# Patient Record
Sex: MALE | Race: WHITE | Employment: FULL TIME | URBAN - METROPOLITAN AREA
[De-identification: names, ages, dates, MRNs, and addresses within clinical notes are randomized per-mention and may not be internally consistent; named-entity substitution may affect disease eponyms.]

---

## 2018-12-05 ENCOUNTER — APPOINTMENT (OUTPATIENT)
Dept: CT IMAGING | Age: 63
DRG: 552 | End: 2018-12-05
Payer: COMMERCIAL

## 2018-12-05 ENCOUNTER — HOSPITAL ENCOUNTER (INPATIENT)
Age: 63
LOS: 3 days | Discharge: HOME | DRG: 552 | End: 2018-12-08
Attending: EMERGENCY MEDICINE | Admitting: FAMILY MEDICINE
Payer: COMMERCIAL

## 2018-12-05 ENCOUNTER — APPOINTMENT (OUTPATIENT)
Dept: GENERAL RADIOLOGY | Age: 63
DRG: 552 | End: 2018-12-05
Payer: COMMERCIAL

## 2018-12-05 ENCOUNTER — APPOINTMENT (OUTPATIENT)
Dept: MRI IMAGING | Age: 63
DRG: 552 | End: 2018-12-05
Payer: COMMERCIAL

## 2018-12-05 DIAGNOSIS — S39.012A LUMBAR STRAIN, INITIAL ENCOUNTER: ICD-10-CM

## 2018-12-05 DIAGNOSIS — M51.26 LUMBAR HERNIATED DISC: ICD-10-CM

## 2018-12-05 DIAGNOSIS — M54.50 ACUTE EXACERBATION OF CHRONIC LOW BACK PAIN: ICD-10-CM

## 2018-12-05 DIAGNOSIS — M54.9 INTRACTABLE BACK PAIN: Primary | ICD-10-CM

## 2018-12-05 DIAGNOSIS — G89.29 ACUTE EXACERBATION OF CHRONIC LOW BACK PAIN: ICD-10-CM

## 2018-12-05 PROBLEM — M54.59 INTRACTABLE LOW BACK PAIN: Status: ACTIVE | Noted: 2018-12-05

## 2018-12-05 PROCEDURE — 72131 CT LUMBAR SPINE W/O DYE: CPT

## 2018-12-05 PROCEDURE — 99285 EMERGENCY DEPT VISIT HI MDM: CPT

## 2018-12-05 PROCEDURE — 96372 THER/PROPH/DIAG INJ SC/IM: CPT

## 2018-12-05 PROCEDURE — 6370000000 HC RX 637 (ALT 250 FOR IP): Performed by: NURSE PRACTITIONER

## 2018-12-05 PROCEDURE — 1200000000 HC SEMI PRIVATE

## 2018-12-05 PROCEDURE — 70030 X-RAY EYE FOR FOREIGN BODY: CPT

## 2018-12-05 PROCEDURE — 72148 MRI LUMBAR SPINE W/O DYE: CPT

## 2018-12-05 PROCEDURE — 2580000003 HC RX 258: Performed by: NURSE PRACTITIONER

## 2018-12-05 PROCEDURE — 6360000002 HC RX W HCPCS: Performed by: NURSE PRACTITIONER

## 2018-12-05 RX ORDER — MORPHINE SULFATE 4 MG/ML
4 INJECTION, SOLUTION INTRAMUSCULAR; INTRAVENOUS ONCE
Status: DISCONTINUED | OUTPATIENT
Start: 2018-12-05 | End: 2018-12-05

## 2018-12-05 RX ORDER — ACETAMINOPHEN 325 MG/1
650 TABLET ORAL EVERY 4 HOURS PRN
Status: DISCONTINUED | OUTPATIENT
Start: 2018-12-05 | End: 2018-12-08 | Stop reason: HOSPADM

## 2018-12-05 RX ORDER — SODIUM CHLORIDE 0.9 % (FLUSH) 0.9 %
10 SYRINGE (ML) INJECTION PRN
Status: DISCONTINUED | OUTPATIENT
Start: 2018-12-05 | End: 2018-12-06 | Stop reason: SDUPTHER

## 2018-12-05 RX ORDER — OXYCODONE HYDROCHLORIDE AND ACETAMINOPHEN 5; 325 MG/1; MG/1
1 TABLET ORAL ONCE
Status: COMPLETED | OUTPATIENT
Start: 2018-12-05 | End: 2018-12-05

## 2018-12-05 RX ORDER — DEXAMETHASONE SODIUM PHOSPHATE 10 MG/ML
10 INJECTION, SOLUTION INTRAMUSCULAR; INTRAVENOUS ONCE
Status: COMPLETED | OUTPATIENT
Start: 2018-12-05 | End: 2018-12-05

## 2018-12-05 RX ORDER — ONDANSETRON 4 MG/1
4 TABLET, ORALLY DISINTEGRATING ORAL ONCE
Status: COMPLETED | OUTPATIENT
Start: 2018-12-05 | End: 2018-12-05

## 2018-12-05 RX ORDER — SODIUM CHLORIDE 0.9 % (FLUSH) 0.9 %
10 SYRINGE (ML) INJECTION EVERY 12 HOURS SCHEDULED
Status: DISCONTINUED | OUTPATIENT
Start: 2018-12-05 | End: 2018-12-06 | Stop reason: SDUPTHER

## 2018-12-05 RX ORDER — ORPHENADRINE CITRATE 30 MG/ML
60 INJECTION INTRAMUSCULAR; INTRAVENOUS ONCE
Status: COMPLETED | OUTPATIENT
Start: 2018-12-05 | End: 2018-12-05

## 2018-12-05 RX ORDER — KETOROLAC TROMETHAMINE 30 MG/ML
30 INJECTION, SOLUTION INTRAMUSCULAR; INTRAVENOUS ONCE
Status: COMPLETED | OUTPATIENT
Start: 2018-12-05 | End: 2018-12-05

## 2018-12-05 RX ADMIN — ORPHENADRINE CITRATE 60 MG: 30 INJECTION INTRAMUSCULAR; INTRAVENOUS at 18:02

## 2018-12-05 RX ADMIN — DEXAMETHASONE SODIUM PHOSPHATE 10 MG: 10 INJECTION, SOLUTION INTRAMUSCULAR; INTRAVENOUS at 19:44

## 2018-12-05 RX ADMIN — OXYCODONE AND ACETAMINOPHEN 1 TABLET: 5; 325 TABLET ORAL at 19:57

## 2018-12-05 RX ADMIN — ONDANSETRON 4 MG: 4 TABLET, ORALLY DISINTEGRATING ORAL at 21:14

## 2018-12-05 RX ADMIN — HYDROMORPHONE HYDROCHLORIDE 1 MG: 1 INJECTION, SOLUTION INTRAMUSCULAR; INTRAVENOUS; SUBCUTANEOUS at 21:20

## 2018-12-05 RX ADMIN — KETOROLAC TROMETHAMINE 30 MG: 30 INJECTION, SOLUTION INTRAMUSCULAR at 18:02

## 2018-12-05 RX ADMIN — Medication 10 ML: at 23:45

## 2018-12-05 ASSESSMENT — PAIN DESCRIPTION - DESCRIPTORS
DESCRIPTORS: ACHING;DISCOMFORT;CONSTANT
DESCRIPTORS: ACHING

## 2018-12-05 ASSESSMENT — PAIN SCALES - GENERAL
PAINLEVEL_OUTOF10: 6
PAINLEVEL_OUTOF10: 10
PAINLEVEL_OUTOF10: 6
PAINLEVEL_OUTOF10: 10
PAINLEVEL_OUTOF10: 10
PAINLEVEL_OUTOF10: 5

## 2018-12-05 ASSESSMENT — PAIN DESCRIPTION - PAIN TYPE
TYPE: ACUTE PAIN

## 2018-12-05 ASSESSMENT — PAIN DESCRIPTION - ONSET: ONSET: ON-GOING

## 2018-12-05 ASSESSMENT — PAIN DESCRIPTION - ORIENTATION
ORIENTATION: LOWER
ORIENTATION: LOWER
ORIENTATION: LEFT;LOWER

## 2018-12-05 ASSESSMENT — PAIN DESCRIPTION - LOCATION
LOCATION: BACK
LOCATION: BACK;LEG
LOCATION: BACK

## 2018-12-05 ASSESSMENT — PAIN DESCRIPTION - PROGRESSION: CLINICAL_PROGRESSION: GRADUALLY IMPROVING

## 2018-12-05 ASSESSMENT — PAIN DESCRIPTION - FREQUENCY: FREQUENCY: CONTINUOUS

## 2018-12-05 NOTE — ED PROVIDER NOTES
ED Attending  CC: Alicia         Department of Emergency Medicine   ED  Provider Note  Admit Date/RoomTime: 12/5/2018  4:59 PM  ED Room: 33/33   Chief Complaint:   Back Pain (shooting pain in lower back when he bent over)    History of Present Illness   Source of history provided by:  patient. History/Exam Limitations: none. Annabella Friedman is a 66 y.o. old male who has a past medical history of:   Past Medical History:   Diagnosis Date    Arthritis     Sleep apnea     CPAP nightly    presents to the emergency department by private vehicle, for acute, sharp, stabbing shooting and throbbing central lower lumbar spine pain with radiation down left leg to the knee that started immediately at  3:45 PM prior to arrival. He will intermittently get an electrical pain. The pain was caused by bending over to  a tool and felt a \"pop\" in his back . He has a history of recurrent self limited episodes of low back pain in the past and previous spinal surgery - Dr. Muñoz Hedge years ago. Since onset the symptoms have been constant and acutely worsening and severe to profound in severity. There has been no bowel or bladder incontinence associated with the pain. There have been associated symptoms of nothing additional and denies any numbness, incontinence, dysuria, hematuria, abdominal pain, fever, hx cancer, weight loss, recent steroid use, tingling, leg weakness, new numbness, new weakness, new tingling, abdominal swelling, chest pain, pelvic pain or perianal numbness. The the pain is aggraveated by standing and any movement and relieved by nothing tried. ROS   Pertinent positives and negatives are stated within HPI, all other systems reviewed and are negative.     Past Surgical History:   Procedure Laterality Date    BACK SURGERY      lumbar laminectomy    COLONOSCOPY  12/2011    ELBOW ARTHROSCOPY      right    KNEE ARTHROSCOPY      right   [address] [address]      Dr. Jaycee Stone - cervical fusion   Social History: reports that he quit smoking about 32 years ago. He smoked 0.25 packs per day. He has never used smokeless tobacco. He reports that he drinks alcohol. He reports that he does not use drugs. Family History: family history includes Cancer in his sister; Heart Disease in his mother; High Blood Pressure in his mother. Allergies: Morphine    Physical Exam           ED Triage Vitals [12/05/18 1703]   BP Temp Temp Source Pulse Resp SpO2 Height Weight   (!) 146/78 97.5 °F (36.4 °C) Temporal 78 17 100 % 5' 10\" (1.778 m) 166 lb (75.3 kg)      Oxygen Saturation Interpretation: Normal.    Constitutional:  Alert, development consistent with age. HEENT:  NC/NT. Airway patent. Neck:  Normal ROM. Supple. Respiratory:  Clear to auscultation and breath sounds equal.  CV:  Regular rate and rhythm, normal heart sounds, without pathological murmurs, ectopy, gallops, or rubs. GI:  Abdomen Soft, nontender, good bowel sounds. No firm or pulsatile mass. Back: lower lumbar spine left sided and central.             Tenderness: Moderate to central lower lumbar spine and left SI joint . Swelling: mild. Range of Motion: diminished range with pain. CVA Tenderness: No.            Straight leg raising: unable to lie down due to excruciating pain            Skin:  no erythema, rash or swelling noted. Distal Function:              Motor deficit: none. Sensory deficit: none; full sensation intact to light touch. Pulse deficit: none. Calf Tenderness:  No Bilateral.               Edema:  none Both lower extremity(s). Reflexes: Bilateral knee,ankle,biceps normal.  Gait:  limp. Integument:  Normal turgor. Warm, dry, without visible rash. Lymphatics: No lymphangitis or adenopathy noted. Neurological:  Oriented. Motor functions intact.     Lab / Imaging Results   (All laboratory and radiology results have been personally reviewed by myself)  Labs:  No

## 2018-12-06 ENCOUNTER — APPOINTMENT (OUTPATIENT)
Dept: GENERAL RADIOLOGY | Age: 63
DRG: 552 | End: 2018-12-06
Payer: COMMERCIAL

## 2018-12-06 PROBLEM — M51.369 DDD (DEGENERATIVE DISC DISEASE), LUMBAR: Status: ACTIVE | Noted: 2018-12-06

## 2018-12-06 PROBLEM — M51.36 DDD (DEGENERATIVE DISC DISEASE), LUMBAR: Status: ACTIVE | Noted: 2018-12-06

## 2018-12-06 PROBLEM — G47.30 SLEEP APNEA: Status: ACTIVE | Noted: 2018-12-06

## 2018-12-06 LAB
ANION GAP SERPL CALCULATED.3IONS-SCNC: 12 MMOL/L (ref 7–16)
BASOPHILS ABSOLUTE: 0.01 E9/L (ref 0–0.2)
BASOPHILS RELATIVE PERCENT: 0.2 % (ref 0–2)
BUN BLDV-MCNC: 14 MG/DL (ref 8–23)
CALCIUM SERPL-MCNC: 9.6 MG/DL (ref 8.6–10.2)
CHLORIDE BLD-SCNC: 104 MMOL/L (ref 98–107)
CO2: 25 MMOL/L (ref 22–29)
CREAT SERPL-MCNC: 0.7 MG/DL (ref 0.7–1.2)
EOSINOPHILS ABSOLUTE: 0 E9/L (ref 0.05–0.5)
EOSINOPHILS RELATIVE PERCENT: 0 % (ref 0–6)
GFR AFRICAN AMERICAN: >60
GFR NON-AFRICAN AMERICAN: >60 ML/MIN/1.73
GLUCOSE BLD-MCNC: 131 MG/DL (ref 74–99)
HCT VFR BLD CALC: 43.3 % (ref 37–54)
HEMOGLOBIN: 14.5 G/DL (ref 12.5–16.5)
IMMATURE GRANULOCYTES #: 0.04 E9/L
IMMATURE GRANULOCYTES %: 0.7 % (ref 0–5)
LYMPHOCYTES ABSOLUTE: 0.55 E9/L (ref 1.5–4)
LYMPHOCYTES RELATIVE PERCENT: 9.4 % (ref 20–42)
MCH RBC QN AUTO: 30.7 PG (ref 26–35)
MCHC RBC AUTO-ENTMCNC: 33.5 % (ref 32–34.5)
MCV RBC AUTO: 91.5 FL (ref 80–99.9)
MONOCYTES ABSOLUTE: 0.12 E9/L (ref 0.1–0.95)
MONOCYTES RELATIVE PERCENT: 2.1 % (ref 2–12)
NEUTROPHILS ABSOLUTE: 5.11 E9/L (ref 1.8–7.3)
NEUTROPHILS RELATIVE PERCENT: 87.6 % (ref 43–80)
PDW BLD-RTO: 12.5 FL (ref 11.5–15)
PLATELET # BLD: 289 E9/L (ref 130–450)
PMV BLD AUTO: 10.3 FL (ref 7–12)
POTASSIUM REFLEX MAGNESIUM: 4.2 MMOL/L (ref 3.5–5)
RBC # BLD: 4.73 E12/L (ref 3.8–5.8)
SODIUM BLD-SCNC: 141 MMOL/L (ref 132–146)
WBC # BLD: 5.8 E9/L (ref 4.5–11.5)

## 2018-12-06 PROCEDURE — 1200000000 HC SEMI PRIVATE

## 2018-12-06 PROCEDURE — 6370000000 HC RX 637 (ALT 250 FOR IP): Performed by: FAMILY MEDICINE

## 2018-12-06 PROCEDURE — 77002 NEEDLE LOCALIZATION BY XRAY: CPT

## 2018-12-06 PROCEDURE — 85025 COMPLETE CBC W/AUTO DIFF WBC: CPT

## 2018-12-06 PROCEDURE — 36415 COLL VENOUS BLD VENIPUNCTURE: CPT

## 2018-12-06 PROCEDURE — 2580000003 HC RX 258: Performed by: FAMILY MEDICINE

## 2018-12-06 PROCEDURE — 00HU33Z INSERTION OF INFUSION DEVICE INTO SPINAL CANAL, PERCUTANEOUS APPROACH: ICD-10-PCS | Performed by: NEUROLOGICAL SURGERY

## 2018-12-06 PROCEDURE — 3E0R3BZ INTRODUCTION OF ANESTHETIC AGENT INTO SPINAL CANAL, PERCUTANEOUS APPROACH: ICD-10-PCS | Performed by: RADIOLOGY

## 2018-12-06 PROCEDURE — 80048 BASIC METABOLIC PNL TOTAL CA: CPT

## 2018-12-06 RX ORDER — ONDANSETRON 2 MG/ML
4 INJECTION INTRAMUSCULAR; INTRAVENOUS EVERY 6 HOURS PRN
Status: DISCONTINUED | OUTPATIENT
Start: 2018-12-06 | End: 2018-12-08 | Stop reason: HOSPADM

## 2018-12-06 RX ORDER — OXYCODONE HYDROCHLORIDE AND ACETAMINOPHEN 5; 325 MG/1; MG/1
2 TABLET ORAL EVERY 4 HOURS PRN
Status: DISCONTINUED | OUTPATIENT
Start: 2018-12-06 | End: 2018-12-08 | Stop reason: HOSPADM

## 2018-12-06 RX ORDER — SODIUM CHLORIDE 0.9 % (FLUSH) 0.9 %
10 SYRINGE (ML) INJECTION EVERY 12 HOURS SCHEDULED
Status: DISCONTINUED | OUTPATIENT
Start: 2018-12-06 | End: 2018-12-08 | Stop reason: HOSPADM

## 2018-12-06 RX ORDER — OXYCODONE HYDROCHLORIDE AND ACETAMINOPHEN 5; 325 MG/1; MG/1
1 TABLET ORAL EVERY 4 HOURS PRN
Status: DISCONTINUED | OUTPATIENT
Start: 2018-12-06 | End: 2018-12-08 | Stop reason: HOSPADM

## 2018-12-06 RX ORDER — SODIUM CHLORIDE 0.9 % (FLUSH) 0.9 %
10 SYRINGE (ML) INJECTION PRN
Status: DISCONTINUED | OUTPATIENT
Start: 2018-12-06 | End: 2018-12-08 | Stop reason: HOSPADM

## 2018-12-06 RX ORDER — FENTANYL CITRATE 50 UG/ML
50 INJECTION, SOLUTION INTRAMUSCULAR; INTRAVENOUS ONCE
Status: COMPLETED | OUTPATIENT
Start: 2018-12-07 | End: 2018-12-07

## 2018-12-06 RX ADMIN — Medication 10 ML: at 20:56

## 2018-12-06 RX ADMIN — OXYCODONE AND ACETAMINOPHEN 2 TABLET: 5; 325 TABLET ORAL at 20:55

## 2018-12-06 RX ADMIN — Medication 10 ML: at 10:00

## 2018-12-06 RX ADMIN — OXYCODONE AND ACETAMINOPHEN 2 TABLET: 5; 325 TABLET ORAL at 10:00

## 2018-12-06 ASSESSMENT — PAIN DESCRIPTION - FREQUENCY
FREQUENCY: CONTINUOUS

## 2018-12-06 ASSESSMENT — PAIN DESCRIPTION - PAIN TYPE
TYPE: ACUTE PAIN

## 2018-12-06 ASSESSMENT — PAIN DESCRIPTION - DESCRIPTORS
DESCRIPTORS: ACHING;CONSTANT
DESCRIPTORS: ACHING;DISCOMFORT;CONSTANT
DESCRIPTORS: ACHING;CONSTANT;DISCOMFORT
DESCRIPTORS: ACHING;CONSTANT;DISCOMFORT

## 2018-12-06 ASSESSMENT — PAIN SCALES - GENERAL
PAINLEVEL_OUTOF10: 4
PAINLEVEL_OUTOF10: 4
PAINLEVEL_OUTOF10: 7
PAINLEVEL_OUTOF10: 6
PAINLEVEL_OUTOF10: 6
PAINLEVEL_OUTOF10: 0
PAINLEVEL_OUTOF10: 4

## 2018-12-06 ASSESSMENT — PAIN DESCRIPTION - ORIENTATION
ORIENTATION: LEFT;LOWER

## 2018-12-06 ASSESSMENT — PAIN DESCRIPTION - PROGRESSION
CLINICAL_PROGRESSION: NOT CHANGED
CLINICAL_PROGRESSION: NOT CHANGED
CLINICAL_PROGRESSION: GRADUALLY IMPROVING

## 2018-12-06 ASSESSMENT — PAIN DESCRIPTION - LOCATION
LOCATION: BACK

## 2018-12-06 ASSESSMENT — PAIN DESCRIPTION - ONSET
ONSET: ON-GOING

## 2018-12-06 NOTE — H&P
positive on the left. Strength:  Lower extremities within normal limits. AXILLAE:  No adenopathy. ABDOMEN:  Soft. No hepatosplenomegaly. No masses. ADMITTING LABORATORY DATA:  BUN 14, creatinine 0.7. White count 5800,  hemoglobin 14.5, platelet count 031X. DIAGNOSTIC STUDIES:  CT scan of the lumbar spine reveals that there is a  moderate amount of degenerative disk disease, most severe at L5-S1. There are mild _____ of central canal narrowing, but without critical  stenosis at L2-3 and L3-4. There is diffuse disk bulge and hypertrophic facet joint arthrosis, most  severe on the left at L3-4. This is associated with the stenosis of the  lateral recesses and there is also a diffuse disk bulge at L4-5. ASSESSMENT:  1.  Lumbar radiculopathy. 2.  DDD, DJD, lumbar spine. 3.  Sleep apnea. PLAN:  Neurosurgery consultation for potential nerve block and analgesic  medication.         Ladi Ventura MD    D: 12/06/2018 9:36:27       T: 12/06/2018 9:38:12     [email or web address]  Job#: [id number]     Doc#: [id number]    CC:

## 2018-12-06 NOTE — CONSULTS
[address]                  [address] [facility], [address] [place] [address]                                  CONSULTATION    PATIENT NAME: Royal Nickerson                      :        1955  MED REC NO:   [id number]                            ROOM:       5216  ACCOUNT NO:   [de-identified]                           ADMIT DATE: 2018  PROVIDER:     Odell Bishop MD    CONSULT DATE:  2018    CHIEF COMPLAINT:  Pain in the back, radiating down to the left lower  extremity. HISTORY OF PRESENT ILLNESS:  This is a 70-year-old male who is known to  me. He had undergone lumbar laminectomy, L3-4, left, six years ago for  lumbar radiculopathy. Following the surgery, he did well and had been  free of pain. Yesterday, he bent over to pick some article from the  ground and felt a pop in the back and since then has been experiencing  severe pain, radiating down from the lower back to the left leg mainly  to the knee, but sometimes down to the foot. The pain is more  pronounced in the back as compared to the left leg and some discomfort  in the right lower extremity. He denies any weakness or numbness as  such in the lower back. He underwent a CT scan of the lumbar spine,  which was consistent with the lumbar herniated disk. Subsequently, he underwent an MRI scan of the lumbar spine today and  this was also reviewed. It is reported to show degenerative disk  disease at multiple levels with a disk protrusion at the level of L4-5  on the right. PAST MEDICAL HISTORY:  Sleep apnea and arthritis. PAST SURGICAL HISTORY:  Back surgery, lumbar laminectomy L3-4, left, in  2012; colonoscopy, elbow, arthroscopic surgery, knee arthroscopic  surgery, and neck surgery. PERSONAL HISTORY:  ALLERGIES:  He is allergic to MORPHINE. SOCIAL HISTORY:  He is a former smoker, does use alcohol, but denies use  of street drugs.     PHYSICAL EXAMINATION:  GENERAL:  He is a

## 2018-12-06 NOTE — PROGRESS NOTES
Temporary Lumbar epidural catheter inserted using fluoroscopy. Pt. Tolerated well. 80mg depomedrol and 2ml 0.25 percent marcaine given.

## 2018-12-06 NOTE — ED NOTES
Lab called at this time in regards to workers comp      Brian La, [facility]  12/05/18 5130

## 2018-12-06 NOTE — PROGRESS NOTES
Subjective: The patient is awake and alert. No problems overnight. Denies chest pain, angina, and dyspnea. Denies abdominal pain. Tolerating diet. No nausea or vomiting. Comfortable when not moving,but has severe pain in low back and lateral aspect of left leg with movement  Objective:    Vitals:  /73   Pulse 83   Temp 98.1 °F (36.7 °C) (Oral)   Resp 14   Ht 5' 10\" (1.778 m)   Wt 166 lb (75.3 kg)   SpO2 99%   BMI 23.82 kg/m²     Physical Exam:  Heart:  RRR, no murmurs, gallops, or rubs. Lungs:  CTA bilaterally, no wheeze, rales or rhonchi  Abd: bowel sounds present, nontender, nondistended, no masses  Extrem:  No clubbing, cyanosis, or edema  Neuro; LE intact. Straight leg raise positive on left  Labs:  CBC:   Lab Results   Component Value Date    WBC 5.8 2018    RBC 4.73 2018    HGB 14.5 2018    HCT 43.3 2018    MCV 91.5 2018    MCH 30.7 2018    MCHC 33.5 2018    RDW 12.5 2018     2018    MPV 10.3 2018     CMP:    Lab Results   Component Value Date     2018    K 4.2 2018     2018    CO2 25 2018    BUN 14 2018    CREATININE 0.7 2018    GFRAA >60 2018    LABGLOM >60 2018    GLUCOSE 131 2018    GLUCOSE 101 2012    CALCIUM 9.6 2018        Radiology:  Ct Lumbar Spine Wo Contrast    Result Date: 2018  Patient MRN:  [id number] : 1955 Age: 66 years Gender: Male Order Date:  2018 5:15 PM EXAM: CT LUMBAR SPINE WO CONTRAST NUMBER OF IMAGES:  613 INDICATION:  Pain Pain COMPARISON: MRI lumbar spine  TECHNIQUE: Helical CT of the lumbar spine was performed from the lower thoracic spine through the upper sacrum. Sagittal reconstructions were also reviewed. Low-dose CT  acquisition technique included one of following options; 1 . Automated exposure control, 2. Adjustment of MA and or KV according to patient's size or 3.  Use of iterative predominantly inferiorly. The patient had a left laminectomy at this level seen on the previous MRI from  but the laminectomy defect is not as prominent as on the prior study due to some heterotopic bone formation. L4-5: Diffuse bulging of the disc with suggestion of a more focal small but broad-based left paracentral disc protrusion. There appears to be at least moderately severe stenosis of the left lateral recess. There is mild stenosis of the right lateral recess and mild central canal stenosis with an AP diameter of 12 mm. This is more pronounced towards the left of midline. This would be better evaluated by MRI. L5-S1: No significant disc bulge or focal disc protrusion. No central canal stenosis. There is mild stenosis of both neural foramina with mild bilateral facet joint arthrosis. CONCLUSION: 1. Multilevel degenerative disc disease, most severe at L5-S1. 2. Mild degrees of central canal narrowing but without critical stenosis at the L2-3 and L3-4 levels due to a combination of diffuse disc bulge and hypertrophic facet joint arthrosis, most severe on the left at L3-4. There is associated stenosis of the lateral recesses. At L3-4 there is moderate left neural foraminal stenosis with abutment of the exiting left L3 nerve root posterolaterally and at L2-3 there is abutment of the exiting right L2 nerve root far laterally by the bulging disc. 3. Diffuse disc bulge at L4-5 with a more focal small but broad-based left paracentral disc protrusion causing moderately severe stenosis of the left lateral recess, mild on the right, and mild central canal stenosis, more pronounced towards the left of midline. This would be better evaluated by MRI. 4. Mild stenosis of both neural foramen at L5-S1 with mild bilateral facet joint arthrosis. Please note discussion above for more complete details.     Mri Lumbar Spine Wo Contrast    Result Date: 2018  Patient MRN:  [id number] : 1955 Age: 66 years Gender: Male Order Date:  2018 9:00 PM TECHNIQUE/NUMBER OF IMAGES/COMPARISON/CLINICAL HISTORY: MRI lumbar spine 128 images T1-T2 and STIR sequences were obtained. HISTORY: Back pain intractable disc disease . Comparison study of 2012 . FINDINGS: There is no indication for bone marrow replacement process . There is a Schmorl node in the posterior-inferior corner of L1 with the some degree of edema in the STIR sequence but not diffusely throughout the subchondral region of the vertebral body . Mild broad posterior displacement seen across midline levels of L1-2, L2-3, L3-4 and L4-5 . There is no significant encroachments of the neural foramina in both sides of the midline. There is no significant degree of anatomical encroachment of the lumbar spine canal . A small right paramedian posterior disc protrusion is seen in L4-L5 level . Degenerative changes are seen in the facet joints of the lumbar spine with some hypertrophy in the levels of L3-4 mainly . There are normal appearance for the contents of the thecal sac. 1. Degenerative disc disease throughout the lumbar spine. 2. Schmorl node with some inflammatory reactive changes related with the presence of the Schmorl node in the posterior inferior corner of L1 . 3. No bone marrow edema replacement process otherwise. 4. Right paramedian posterior disc protrusion at L4-L5 with compression of the inferior aspect of the thecal sac. 5. No significant anatomical encroachment of the neural foramina bilaterally. Xr Eye Foreign Body    Result Date: 2018  Patient MRN:  [id number] : 1955 Age: 66 years Gender: Male Order Date:  2018 9:30 PM EXAM: XR EYE FOREIGN BODY NUMBER OF IMAGES:  2 INDICATION:  per protocol per protocol COMPARISON: None FINDINGS: AP and lateral views of the orbits were obtained. No radiopaque foreign bodies are identified in relationship to the orbits or elsewhere over the skull as visualized. Bilateral hearing aids are in place.

## 2018-12-07 PROCEDURE — 1200000000 HC SEMI PRIVATE

## 2018-12-07 PROCEDURE — 2580000003 HC RX 258: Performed by: FAMILY MEDICINE

## 2018-12-07 PROCEDURE — 6360000002 HC RX W HCPCS: Performed by: NEUROLOGICAL SURGERY

## 2018-12-07 RX ORDER — NALOXONE HYDROCHLORIDE 0.4 MG/ML
0.4 INJECTION, SOLUTION INTRAMUSCULAR; INTRAVENOUS; SUBCUTANEOUS PRN
Status: DISCONTINUED | OUTPATIENT
Start: 2018-12-07 | End: 2018-12-08 | Stop reason: HOSPADM

## 2018-12-07 RX ORDER — METHYLPREDNISOLONE ACETATE 80 MG/ML
80 INJECTION, SUSPENSION INTRA-ARTICULAR; INTRALESIONAL; INTRAMUSCULAR; SOFT TISSUE ONCE
Status: COMPLETED | OUTPATIENT
Start: 2018-12-08 | End: 2018-12-08

## 2018-12-07 RX ORDER — DIPHENHYDRAMINE HYDROCHLORIDE 50 MG/ML
25 INJECTION INTRAMUSCULAR; INTRAVENOUS EVERY 6 HOURS PRN
Status: DISCONTINUED | OUTPATIENT
Start: 2018-12-07 | End: 2018-12-08 | Stop reason: HOSPADM

## 2018-12-07 RX ORDER — FENTANYL CITRATE 50 UG/ML
25 INJECTION, SOLUTION INTRAMUSCULAR; INTRAVENOUS ONCE
Status: COMPLETED | OUTPATIENT
Start: 2018-12-08 | End: 2018-12-08

## 2018-12-07 RX ADMIN — FENTANYL CITRATE 50 MCG: 50 INJECTION INTRAMUSCULAR; INTRAVENOUS at 16:04

## 2018-12-07 RX ADMIN — DIPHENHYDRAMINE HYDROCHLORIDE 25 MG: 50 INJECTION, SOLUTION INTRAMUSCULAR; INTRAVENOUS at 17:07

## 2018-12-07 RX ADMIN — Medication 10 ML: at 10:02

## 2018-12-07 ASSESSMENT — PAIN SCALES - GENERAL
PAINLEVEL_OUTOF10: 0
PAINLEVEL_OUTOF10: 0

## 2018-12-07 NOTE — PROGRESS NOTES
Subjective: The patient is awake and alert. No problems overnight. Denies chest pain, angina, and dyspnea. Denies abdominal pain. Tolerating diet. No nausea or vomiting. less back and leg pain    Objective:    Vitals:  /77   Pulse 67   Temp 98.3 °F (36.8 °C) (Temporal)   Resp 16   Ht 5' 10\" (1.778 m)   Wt 166 lb (75.3 kg)   SpO2 98%   BMI 23.82 kg/m²     Physical Exam:  Heart:  RRR, no murmurs, gallops, or rubs. Lungs:  CTA bilaterally, no wheeze, rales or rhonchi  Abd: bowel sounds present, nontender, nondistended, no masses  Extrem:  No clubbing, cyanosis, or edema  Neuro; LE normal  Labs:  CBC:   Lab Results   Component Value Date    WBC 5.8 2018    RBC 4.73 2018    HGB 14.5 2018    HCT 43.3 2018    MCV 91.5 2018    MCH 30.7 2018    MCHC 33.5 2018    RDW 12.5 2018     2018    MPV 10.3 2018     BMP:    Lab Results   Component Value Date     2018    K 4.2 2018     2018    CO2 25 2018    BUN 14 2018    CREATININE 0.7 2018    CALCIUM 9.6 2018    GFRAA >60 2018    LABGLOM >60 2018    GLUCOSE 131 2018    GLUCOSE 101 2012        Radiology:  Ct Lumbar Spine Wo Contrast    Result Date: 2018  Patient MRN:  [id number] : 1955 Age: 66 years Gender: Male Order Date:  2018 5:15 PM EXAM: CT LUMBAR SPINE WO CONTRAST NUMBER OF IMAGES:  613 INDICATION:  Pain Pain COMPARISON: MRI lumbar spine  TECHNIQUE: Helical CT of the lumbar spine was performed from the lower thoracic spine through the upper sacrum. Sagittal reconstructions were also reviewed. Low-dose CT  acquisition technique included one of following options; 1 . Automated exposure control, 2. Adjustment of MA and or KV according to patient's size or 3. Use of iterative reconstruction. FINDINGS: Vertebral body height and alignment are normally maintained.  There is mild loss of disc height at the L1-2 and L3-4 levels and moderate loss of height at L5-S1, more pronounced towards the right of midline. There is slight loss of disc height at L2-3. There is mild vacuum disc phenomenon at L5-S1. There are small anterior marginal osteophytes throughout the lumbar spine, more pronounced at L1-2 anterolaterally towards the right. There is no evidence of acute fracture. No osteoblastic or osteolytic lesions are seen. L1-to: Mild diffuse bulging of the disc, more pronounced laterally/posterolaterally with mild narrowing of the lateral recesses. There is no significant central canal stenosis. There is mild narrowing of the inferior aspects of both neural foramina but there is no significant nerve root compression or displacement. L2-3: Mild diffuse bulging of the disc, asymmetrically more pronounced laterally/posterolaterally towards the right. There is mild facet joint arthrosis/hypertrophy on the left. There is mild to moderate narrowing of the lateral recesses and overall mild central canal narrowing predominantly in the transverse plane with an interfacetal diameter of 12 mm. There is mild narrowing of both neural foramina predominantly inferiorly but no significant nerve root compression or displacement is seen. There is abutment of the exiting right L2 nerve root far laterally by the bulging disc. L3-4: Mild diffuse bulging of the disc with moderate hypertrophic facet joint arthrosis on the left, mild on the right and mild ligamentum flavum hypertrophy. There is moderate stenosis of the lateral recesses and mild central canal narrowing predominantly in the transverse plane with interfacetal diameter of 11 mm. There is moderate stenosis of the left neural foramen with abutment of the exiting left L3 nerve root posterolaterally. There is mild narrowing of the right neural foramen predominantly inferiorly.  The patient had a left laminectomy at this level seen on the previous MRI from 2012 but the laminectomy defect is not as prominent as on the prior study due to some heterotopic bone formation. L4-5: Diffuse bulging of the disc with suggestion of a more focal small but broad-based left paracentral disc protrusion. There appears to be at least moderately severe stenosis of the left lateral recess. There is mild stenosis of the right lateral recess and mild central canal stenosis with an AP diameter of 12 mm. This is more pronounced towards the left of midline. This would be better evaluated by MRI. L5-S1: No significant disc bulge or focal disc protrusion. No central canal stenosis. There is mild stenosis of both neural foramina with mild bilateral facet joint arthrosis. CONCLUSION: 1. Multilevel degenerative disc disease, most severe at L5-S1. 2. Mild degrees of central canal narrowing but without critical stenosis at the L2-3 and L3-4 levels due to a combination of diffuse disc bulge and hypertrophic facet joint arthrosis, most severe on the left at L3-4. There is associated stenosis of the lateral recesses. At L3-4 there is moderate left neural foraminal stenosis with abutment of the exiting left L3 nerve root posterolaterally and at L2-3 there is abutment of the exiting right L2 nerve root far laterally by the bulging disc. 3. Diffuse disc bulge at L4-5 with a more focal small but broad-based left paracentral disc protrusion causing moderately severe stenosis of the left lateral recess, mild on the right, and mild central canal stenosis, more pronounced towards the left of midline. This would be better evaluated by MRI. 4. Mild stenosis of both neural foramen at L5-S1 with mild bilateral facet joint arthrosis. Please note discussion above for more complete details.     Mri Lumbar Spine Wo Contrast    Result Date: 2018  Patient MRN:  [id number] : 1955 Age: 66 years Gender: Male Order Date:  2018 9:00 PM TECHNIQUE/NUMBER OF IMAGES/COMPARISON/CLINICAL HISTORY: MRI lumbar spine 128 images T1-T2 and STIR sequences were obtained. HISTORY: Back pain intractable disc disease . Comparison study of 2012 . FINDINGS: There is no indication for bone marrow replacement process . There is a Schmorl node in the posterior-inferior corner of L1 with the some degree of edema in the STIR sequence but not diffusely throughout the subchondral region of the vertebral body . Mild broad posterior displacement seen across midline levels of L1-2, L2-3, L3-4 and L4-5 . There is no significant encroachments of the neural foramina in both sides of the midline. There is no significant degree of anatomical encroachment of the lumbar spine canal . A small right paramedian posterior disc protrusion is seen in L4-L5 level . Degenerative changes are seen in the facet joints of the lumbar spine with some hypertrophy in the levels of L3-4 mainly . There are normal appearance for the contents of the thecal sac. 1. Degenerative disc disease throughout the lumbar spine. 2. Schmorl node with some inflammatory reactive changes related with the presence of the Schmorl node in the posterior inferior corner of L1 . 3. No bone marrow edema replacement process otherwise. 4. Right paramedian posterior disc protrusion at L4-L5 with compression of the inferior aspect of the thecal sac. 5. No significant anatomical encroachment of the neural foramina bilaterally. Xr Eye Foreign Body    Result Date: 2018  Patient MRN:  [id number] : 1955 Age: 66 years Gender: Male Order Date:  2018 9:30 PM EXAM: XR EYE FOREIGN BODY NUMBER OF IMAGES:  2 INDICATION:  per protocol per protocol COMPARISON: None FINDINGS: AP and lateral views of the orbits were obtained. No radiopaque foreign bodies are identified in relationship to the orbits or elsewhere over the skull as visualized. Bilateral hearing aids are in place. The paranasal sinuses appear clear to the extent visualized. No bony abnormality is seen. CONCLUSION: Negative screening orbits. Note is made of bilateral hearing aids. Fl Guided Needle Placement    Result Date: 2018  Patient MRN: [id number] : 1955 Age:  66 years Gender: Male Order Date: 2018 9:15 AM Exam: FL GUIDED NEEDLE PLACEMENT Number of Views: 2 Indication:  Epidural nerve block Comparison: Lumbar spine 2018 Findings: A spinal needle was noted at indeterminate spinal location on 2 fluoroscopic images. This radiologist was not present for the procedure and was not involved in the decision making process. Spinal needle at indeterminate spinal location spinal level. See above for details. . Fluoroscopic time 0.2 minutes.        Assessment:    Patient Active Problem List   Diagnosis    Lumbar radiculopathy    Intractable low back pain    DDD (degenerative disc disease), lumbar    Sleep apnea       Plan:will receive second and third epidural injections today and tomorrow          Likely discharge on 18 home            Electronically signed by Arik Rose MD on 2018 at 1:58 PM

## 2018-12-07 NOTE — CARE COORDINATION
Went in to see the patient and he lives with his wife in a 1 story home with 2 steps . His PCP is Dr Mari Snell and his Pharmacy is [facility] in [place] . The patient is awaiting Dr Kylie Gama  To inject his  Epidural . Plan is home most likely in am after 3 rd injection. He has no discharge needs still has some pain while getting oob.  I will continue to follow Thanks [place]-Rei

## 2018-12-08 VITALS
TEMPERATURE: 97.8 F | DIASTOLIC BLOOD PRESSURE: 81 MMHG | HEIGHT: 70 IN | BODY MASS INDEX: 23.77 KG/M2 | HEART RATE: 77 BPM | RESPIRATION RATE: 16 BRPM | OXYGEN SATURATION: 98 % | WEIGHT: 166 LBS | SYSTOLIC BLOOD PRESSURE: 126 MMHG

## 2018-12-08 PROCEDURE — 2580000003 HC RX 258: Performed by: FAMILY MEDICINE

## 2018-12-08 PROCEDURE — 6360000002 HC RX W HCPCS: Performed by: NEUROLOGICAL SURGERY

## 2018-12-08 RX ADMIN — FENTANYL CITRATE 25 MCG: 50 INJECTION INTRAMUSCULAR; INTRAVENOUS at 11:30

## 2018-12-08 RX ADMIN — Medication 10 ML: at 09:00

## 2018-12-08 RX ADMIN — METHYLPREDNISOLONE ACETATE 80 MG: 80 INJECTION, SUSPENSION INTRA-ARTICULAR; INTRALESIONAL; INTRAMUSCULAR; SOFT TISSUE at 11:30

## 2018-12-08 ASSESSMENT — PAIN DESCRIPTION - DESCRIPTORS: DESCRIPTORS: DISCOMFORT

## 2018-12-08 ASSESSMENT — PAIN SCALES - GENERAL
PAINLEVEL_OUTOF10: 0
PAINLEVEL_OUTOF10: 8
PAINLEVEL_OUTOF10: 1

## 2018-12-08 ASSESSMENT — PAIN DESCRIPTION - PAIN TYPE
TYPE: ACUTE PAIN
TYPE: ACUTE PAIN

## 2018-12-08 ASSESSMENT — PAIN DESCRIPTION - ORIENTATION
ORIENTATION: LOWER;MID
ORIENTATION: MID

## 2018-12-08 ASSESSMENT — PAIN DESCRIPTION - LOCATION
LOCATION: BACK
LOCATION: BACK

## 2018-12-09 NOTE — DISCHARGE SUMMARY
Hospital Medicine Discharge Summary    Patient: Manpreet Wang     Gender: male  : 1955   Age: 66 y.o. MRN: [id number]    Code Status: full code    Primary Care Provider: Jose Victor MD    Admit Date: 2018   Discharge Date: 2018      Admitting Physician: Jose Victor MD  Discharge Physician: Ebony Zepeda DO     Discharge Diagnoses: Active Hospital Problems    Diagnosis Date Noted    DDD (degenerative disc disease), lumbar [M51.36] 2018    Sleep apnea [G47.30] 2018    Intractable low back pain [M54.5] 2018       Hospital Course:     I did not see this patient. Per the staff,  He is to be discharged on no medication. Per Dr. Lina Leslie. Disposition:  Home    Exam:     /81   Pulse 77   Temp 97.8 °F (36.6 °C) (Temporal)   Resp 16   Ht 5' 10\" (1.778 m)   Wt 166 lb (75.3 kg)   SpO2 98%   BMI 23.82 kg/m²     None    Labs: For convenience and continuity at follow-up the following most recent labs are provided:    Lab Results   Component Value Date    WBC 5.8 2018    HGB 14.5 2018    HCT 43.3 2018    MCV 91.5 2018     2018     2018    K 4.2 2018     2018    CO2 25 2018    BUN 14 2018    CREATININE 0.7 2018    CALCIUM 9.6 2018     No results found for: INR    Radiology:  FL GUIDED NEEDLE PLACEMENT   Final Result   Spinal needle at indeterminate spinal location spinal   level. See above for details. . Fluoroscopic time 0.2 minutes. MRI Lumbar Spine WO Contrast   Final Result   1. Degenerative disc disease throughout the lumbar spine. 2. Schmorl node with some inflammatory reactive changes related with   the presence of the Schmorl node in the posterior inferior corner of   L1 .      3. No bone marrow edema replacement process otherwise.       4. Right paramedian posterior disc protrusion at L4-L5 with   compression of the inferior aspect of the thecal sac.      5. No significant anatomical encroachment of the neural foramina   bilaterally. XR Eye Foreign Body   Final Result      CT Lumbar Spine WO Contrast   Final Result          Discharge Medications:   Discharge Medication List as of 12/8/2018  5:33 PM        Discharge Medication List as of 12/8/2018  5:33 PM        Discharge Medication List as of 12/8/2018  5:33 PM      CONTINUE these medications which have NOT CHANGED    Details   ibuprofen (ADVIL;MOTRIN) 800 MG tablet Take 1 tablet by mouth every 8 hours as needed for Pain., Disp-30 tablet, R-0           Discharge Medication List as of 12/8/2018  5:33 PM          Follow-up appointments:  1 week    Provider Follow-up:    pmd    Condition at Discharge:  Stable    The patient was  NOT seen and examined on day of discharge      Signed:    Ebony Zepeda DO   12/9/2018      Thank you Shira Mayer MD for the opportunity to be involved in this patient's care.  If you have any questions or concerns please feel free to contact me at [phone]

## 2021-08-12 ENCOUNTER — OFFICE VISIT (OUTPATIENT)
Dept: PRIMARY CARE CLINIC | Age: 66
End: 2021-08-12
Payer: COMMERCIAL

## 2021-08-12 VITALS
HEIGHT: 70 IN | BODY MASS INDEX: 23.05 KG/M2 | HEART RATE: 74 BPM | OXYGEN SATURATION: 98 % | WEIGHT: 161 LBS | DIASTOLIC BLOOD PRESSURE: 82 MMHG | SYSTOLIC BLOOD PRESSURE: 136 MMHG | RESPIRATION RATE: 12 BRPM | TEMPERATURE: 97.5 F

## 2021-08-12 DIAGNOSIS — M25.50 ARTHRALGIA, UNSPECIFIED JOINT: ICD-10-CM

## 2021-08-12 DIAGNOSIS — E78.00 PURE HYPERCHOLESTEROLEMIA: ICD-10-CM

## 2021-08-12 DIAGNOSIS — F51.01 PRIMARY INSOMNIA: ICD-10-CM

## 2021-08-12 DIAGNOSIS — Z12.5 SCREENING PSA (PROSTATE SPECIFIC ANTIGEN): ICD-10-CM

## 2021-08-12 DIAGNOSIS — R53.82 CHRONIC FATIGUE: Primary | ICD-10-CM

## 2021-08-12 PROCEDURE — 99204 OFFICE O/P NEW MOD 45 MIN: CPT | Performed by: FAMILY MEDICINE

## 2021-08-12 RX ORDER — ASPIRIN 81 MG/1
81 TABLET ORAL DAILY
COMMUNITY

## 2021-08-12 RX ORDER — MELOXICAM 7.5 MG/1
7.5 TABLET ORAL DAILY
Qty: 30 TABLET | Refills: 2 | Status: SHIPPED
Start: 2021-08-12 | End: 2021-11-04

## 2021-08-12 RX ORDER — VIT C/B6/B5/MAGNESIUM/HERB 173 50-5-6-5MG
1000 CAPSULE ORAL DAILY
COMMUNITY

## 2021-08-12 RX ORDER — OMEGA-3 FATTY ACIDS CAP DELAYED RELEASE 1000 MG 1000 MG
3000 CAPSULE DELAYED RELEASE ORAL DAILY
COMMUNITY

## 2021-08-12 SDOH — ECONOMIC STABILITY: FOOD INSECURITY: WITHIN THE PAST 12 MONTHS, YOU WORRIED THAT YOUR FOOD WOULD RUN OUT BEFORE YOU GOT MONEY TO BUY MORE.: NEVER TRUE

## 2021-08-12 SDOH — ECONOMIC STABILITY: FOOD INSECURITY: WITHIN THE PAST 12 MONTHS, THE FOOD YOU BOUGHT JUST DIDN'T LAST AND YOU DIDN'T HAVE MONEY TO GET MORE.: NEVER TRUE

## 2021-08-12 ASSESSMENT — ENCOUNTER SYMPTOMS
BLOOD IN STOOL: 0
DIARRHEA: 0
SHORTNESS OF BREATH: 0
CONSTIPATION: 0
BACK PAIN: 1

## 2021-08-12 ASSESSMENT — PATIENT HEALTH QUESTIONNAIRE - PHQ9
1. LITTLE INTEREST OR PLEASURE IN DOING THINGS: 0
SUM OF ALL RESPONSES TO PHQ QUESTIONS 1-9: 0
SUM OF ALL RESPONSES TO PHQ9 QUESTIONS 1 & 2: 0
SUM OF ALL RESPONSES TO PHQ QUESTIONS 1-9: 0
SUM OF ALL RESPONSES TO PHQ QUESTIONS 1-9: 0
2. FEELING DOWN, DEPRESSED OR HOPELESS: 0

## 2021-08-12 ASSESSMENT — SOCIAL DETERMINANTS OF HEALTH (SDOH): HOW HARD IS IT FOR YOU TO PAY FOR THE VERY BASICS LIKE FOOD, HOUSING, MEDICAL CARE, AND HEATING?: NOT HARD AT ALL

## 2021-08-16 ENCOUNTER — TELEPHONE (OUTPATIENT)
Dept: PRIMARY CARE CLINIC | Age: 66
End: 2021-08-16

## 2021-08-16 DIAGNOSIS — E78.00 PURE HYPERCHOLESTEROLEMIA: Primary | ICD-10-CM

## 2021-08-16 NOTE — TELEPHONE ENCOUNTER
Discussed elevated cholesterol. He was started on a low-cholesterol diet we will repeat labs in 3 to 4 months.

## 2021-11-04 DIAGNOSIS — M25.50 ARTHRALGIA, UNSPECIFIED JOINT: ICD-10-CM

## 2021-11-04 RX ORDER — MELOXICAM 7.5 MG/1
7.5 TABLET ORAL DAILY
Qty: 30 TABLET | Refills: 2 | Status: SHIPPED
Start: 2021-11-04 | End: 2022-02-08 | Stop reason: SDUPTHER

## 2022-02-07 ENCOUNTER — NURSE TRIAGE (OUTPATIENT)
Dept: OTHER | Facility: CLINIC | Age: 67
End: 2022-02-07

## 2022-02-07 NOTE — TELEPHONE ENCOUNTER
Reason for Disposition   MILD dizziness (e.g., walking normally) AND has NOT been evaluated by physician for this (Exception: dizziness caused by heat exposure, sudden standing, or poor fluid intake)    Protocols used: DIZZINESS-ADULT-OH    Received call from [facility] with [employer]. Subjective: Caller states \"I have been dizzy in the mornings for about 1 week\"     Current Symptoms: Dizziness for 1 week when waking in the AM. Goes away after the morning and returns the next morning    Onset: 1 week    Associated Symptoms: Reports wears a c-pap and does report nasal congestion    Pain Severity: No pain    Temperature: Denies fever    What has been tried: Extra fluids and decongestant      Recommended disposition: See within 3 days    Care advice provided, patient verbalizes understanding; denies any other questions or concerns; instructed to call back for any new or worsening symptoms. Attention Provider: Thank you for allowing me to participate in the care of your patient. The patient was connected to triage in response to information provided to the ECC/PSC. Please do not respond through this encounter as the response is not directed to a shared pool.

## 2022-02-08 ENCOUNTER — OFFICE VISIT (OUTPATIENT)
Dept: PRIMARY CARE CLINIC | Age: 67
End: 2022-02-08
Payer: COMMERCIAL

## 2022-02-08 VITALS
SYSTOLIC BLOOD PRESSURE: 120 MMHG | BODY MASS INDEX: 24.05 KG/M2 | HEIGHT: 70 IN | TEMPERATURE: 97.3 F | DIASTOLIC BLOOD PRESSURE: 72 MMHG | RESPIRATION RATE: 14 BRPM | HEART RATE: 81 BPM | OXYGEN SATURATION: 98 % | WEIGHT: 168 LBS

## 2022-02-08 DIAGNOSIS — H81.13 BENIGN PAROXYSMAL POSITIONAL VERTIGO DUE TO BILATERAL VESTIBULAR DISORDER: Primary | ICD-10-CM

## 2022-02-08 DIAGNOSIS — M25.50 ARTHRALGIA, UNSPECIFIED JOINT: ICD-10-CM

## 2022-02-08 PROCEDURE — 99213 OFFICE O/P EST LOW 20 MIN: CPT | Performed by: FAMILY MEDICINE

## 2022-02-08 RX ORDER — MELOXICAM 7.5 MG/1
7.5 TABLET ORAL DAILY
Qty: 30 TABLET | Refills: 2 | Status: SHIPPED
Start: 2022-02-08 | End: 2022-05-09

## 2022-02-08 RX ORDER — MECLIZINE HCL 12.5 MG/1
12.5 TABLET ORAL 3 TIMES DAILY PRN
Qty: 15 TABLET | Refills: 0 | Status: SHIPPED | OUTPATIENT
Start: 2022-02-08 | End: 2022-02-18

## 2022-02-08 ASSESSMENT — ENCOUNTER SYMPTOMS
NAUSEA: 0
VOMITING: 0

## 2022-02-08 NOTE — PROGRESS NOTES
Megan Garcia, a male of 74 y.o. came to the office 2/8/2022. Patient Active Problem List   Diagnosis    Lumbar radiculopathy    Intractable low back pain    DDD (degenerative disc disease), lumbar    Sleep apnea    Pure hypercholesterolemia          Dizziness  This is a new problem. Episode onset: 3 wks. The problem has been unchanged. Associated symptoms include vertigo. Pertinent negatives include no nausea or vomiting. The symptoms are aggravated by bending (getting down under car to set rack and when gets up from bed. ). He has tried nothing for the symptoms.    - he wonders if its due to his cpap since didn't wear last night and things seem better. Allergies   Allergen Reactions    Morphine Nausea And Vomiting       Current Outpatient Medications on File Prior to Visit   Medication Sig Dispense Refill    Multiple Vitamins-Minerals (MENS 50+ MULTI VITAMIN/MIN PO) Take by mouth daily      NONFORMULARY nugenix      Turmeric 500 MG CAPS Take 1,000 mg by mouth daily      Omega-3 Fatty Acids (FISH OIL) 1000 MG CPDR Take 3,000 mg by mouth daily      NONFORMULARY Pure nature      Apoaequorin (PREVAGEN PO) Take by mouth daily      aspirin 81 MG EC tablet Take 81 mg by mouth daily      Glucosamine-Chondroitin-MSM (TRIPLE FLEX PO) Take by mouth daily       No current facility-administered medications on file prior to visit. Review of Systems   Gastrointestinal: Negative for nausea and vomiting. Neurological: Positive for dizziness and vertigo. other review of systems reviewed and are negative    OBJECTIVE:  /72   Pulse 81   Temp 97.3 °F (36.3 °C)   Resp 14   Ht 5' 10\" (1.778 m)   Wt 168 lb (76.2 kg)   SpO2 98%   BMI 24.11 kg/m²      Physical Exam  Constitutional:       General: He is not in acute distress. HENT:      Right Ear: Tympanic membrane normal.      Left Ear: Tympanic membrane normal.      Mouth/Throat:      Pharynx: Uvula midline.    Cardiovascular:      Rate and Rhythm: Normal rate and regular rhythm. Pulmonary:      Effort: Pulmonary effort is normal.      Breath sounds: Normal breath sounds. Musculoskeletal:      Cervical back: Neck supple. Lymphadenopathy:      Cervical: No cervical adenopathy. Neurological:      Coordination: Romberg sign negative. Comments: - Hallpike maneuver. ASSESSMENT AND PLAN:    Uli Lemus was seen today for dizziness. Diagnoses and all orders for this visit:    Benign paroxysmal positional vertigo due to bilateral vestibular disorder  -     meclizine (ANTIVERT) 12.5 MG tablet; Take 1 tablet by mouth 3 times daily as needed for Dizziness    Arthralgia, unspecified joint  -     meloxicam (MOBIC) 7.5 MG tablet; Take 1 tablet by mouth daily    - hold wearing cpap for next wk and see is dizziness remits. If not try Antivert. - recommend colonoscopy     Return if symptoms worsen or fail to improve.     Anthony Clarke, DO

## 2022-04-22 NOTE — PROGRESS NOTES
50 mics preservative free Fentanyl given via epidural catheter. Plan 3rd injection in am.    Stable from a neurosurgical standpoint. Airway patent, TM normal bilaterally, normal appearing mouth, nose, throat, neck supple with full range of motion, no cervical adenopathy. Teeth/TMJ intact. Occipital hematoma

## 2022-05-08 DIAGNOSIS — M25.50 ARTHRALGIA, UNSPECIFIED JOINT: ICD-10-CM

## 2022-05-09 RX ORDER — MELOXICAM 7.5 MG/1
7.5 TABLET ORAL DAILY
Qty: 30 TABLET | Refills: 2 | Status: SHIPPED
Start: 2022-05-09 | End: 2022-08-16

## 2022-08-16 DIAGNOSIS — M25.50 ARTHRALGIA, UNSPECIFIED JOINT: ICD-10-CM

## 2022-08-16 RX ORDER — MELOXICAM 7.5 MG/1
7.5 TABLET ORAL DAILY
Qty: 30 TABLET | Refills: 2 | Status: SHIPPED | OUTPATIENT
Start: 2022-08-16

## 2022-12-12 ENCOUNTER — TELEPHONE (OUTPATIENT)
Dept: PRIMARY CARE CLINIC | Age: 67
End: 2022-12-12

## 2022-12-12 NOTE — TELEPHONE ENCOUNTER
Pt wife called and states they both tested positive last week, symptoms were improving and feeling better until yesterday pt woke up and felt worse. Was taking OTC meds Mucinex, sudafed, and tylenol but they are not helping anymore. Still has cough, congestion, body aches, and not feeling well altogether. Wanted to know what can be done in regards to pts condition. If something can be prescribed or if a virtual appointment is needed. Please advise.

## 2022-12-12 NOTE — TELEPHONE ENCOUNTER
Not much can do antiviral wise for this since its been over several days since he developed COVID. Continue current medications. If worsening with colored mucus virtual visit needed.

## 2022-12-14 DIAGNOSIS — M25.50 ARTHRALGIA, UNSPECIFIED JOINT: ICD-10-CM

## 2022-12-15 ENCOUNTER — TELEMEDICINE (OUTPATIENT)
Dept: PRIMARY CARE CLINIC | Age: 67
End: 2022-12-15
Payer: COMMERCIAL

## 2022-12-15 DIAGNOSIS — M54.2 NECK PAIN ON RIGHT SIDE: Primary | ICD-10-CM

## 2022-12-15 PROCEDURE — 1123F ACP DISCUSS/DSCN MKR DOCD: CPT | Performed by: FAMILY MEDICINE

## 2022-12-15 PROCEDURE — 99213 OFFICE O/P EST LOW 20 MIN: CPT | Performed by: FAMILY MEDICINE

## 2022-12-15 RX ORDER — MELOXICAM 7.5 MG/1
7.5 TABLET ORAL DAILY
Qty: 30 TABLET | Refills: 2 | OUTPATIENT
Start: 2022-12-15

## 2022-12-15 RX ORDER — MELOXICAM 15 MG/1
15 TABLET ORAL DAILY PRN
Qty: 30 TABLET | Refills: 5 | Status: SHIPPED | OUTPATIENT
Start: 2022-12-15

## 2022-12-15 ASSESSMENT — PATIENT HEALTH QUESTIONNAIRE - PHQ9
SUM OF ALL RESPONSES TO PHQ9 QUESTIONS 1 & 2: 0
SUM OF ALL RESPONSES TO PHQ QUESTIONS 1-9: 0
2. FEELING DOWN, DEPRESSED OR HOPELESS: 0
1. LITTLE INTEREST OR PLEASURE IN DOING THINGS: 0

## 2022-12-15 NOTE — PROGRESS NOTES
Steven Walker, a male of 74 y. o.did a virtual visit on 12/15/2022. Patient Active Problem List   Diagnosis    Lumbar radiculopathy    Intractable low back pain    DDD (degenerative disc disease), lumbar    Sleep apnea    Pure hypercholesterolemia          Neck Pain   This is a new problem. The current episode started more than 1 month ago (3). Associated with: looking up working overhead as . The pain is present in the right side. The quality of the pain is described as aching. Exacerbated by: looking up. Pertinent negatives include no numbness, tingling or weakness. Associated symptoms comments: Dec ROm to R. . He has tried NSAIDs for the symptoms. The treatment provided mild relief.      covid 19 infection: 12/7/22 diagnosed. Having right neck pain and behind right ear and down R shoulder before this even. Symptoms for 3 months. Allergies   Allergen Reactions    Morphine Nausea And Vomiting       Current Outpatient Medications on File Prior to Visit   Medication Sig Dispense Refill    meloxicam (MOBIC) 7.5 MG tablet TAKE 1 TABLET BY MOUTH DAILY 30 tablet 2    Multiple Vitamins-Minerals (MENS 50+ MULTI VITAMIN/MIN PO) Take by mouth daily      NONFORMULARY nugenix      Turmeric 500 MG CAPS Take 1,000 mg by mouth daily      Omega-3 Fatty Acids (FISH OIL) 1000 MG CPDR Take 3,000 mg by mouth daily      NONFORMULARY Pure nature      Apoaequorin (PREVAGEN PO) Take by mouth daily      aspirin 81 MG EC tablet Take 81 mg by mouth daily      Glucosamine-Chondroitin-MSM (TRIPLE FLEX PO) Take by mouth daily       No current facility-administered medications on file prior to visit. Review of Systems   Musculoskeletal:  Positive for neck pain. Neurological:  Negative for tingling, weakness and numbness. other review of systems reviewed and are negative    OBJECTIVE:  There were no vitals taken for this visit. Physical Exam  Constitutional:       General: He is not in acute distress. Appearance: Normal appearance. He is not ill-appearing, toxic-appearing or diaphoretic. HENT:      Head: Normocephalic. Eyes:      General: No scleral icterus. Pulmonary:      Effort: Pulmonary effort is normal.   Neurological:      Mental Status: He is alert and oriented to person, place, and time. Psychiatric:         Mood and Affect: Mood normal.       ASSESSMENT AND PLAN:    Brodie Pate was seen today for post-covid symptoms. Diagnoses and all orders for this visit:    Neck pain on right side  -     XR CERVICAL SPINE (2-3 VIEWS); Future  -     meloxicam (MOBIC) 15 MG tablet; Take 1 tablet by mouth daily as needed for Pain    - HO cervical neck exercises. - increase Mobic to 15 mg daily. - recommend cpe for labs and eval.     Return for based on results. Gregoria Fountain, DO    TeleMedicine Patient Consent    This visit was performed as a virtual video visit using a synchronous, two-way, audio-video telehealth technology platform. Patient identification was verified at the start of the visit, including the patient's telephone number and physical location. I discussed with the patient the nature of our telehealth visits, that:     Due to the nature of an audio- video modality, the only components of a physical exam that could be done are the elements supported by direct observation. I would evaluate the patient and recommend diagnostics and treatments based on my assessment. If it was felt that the patient should be evaluated in clinic or an emergency room setting, then they would be directed there. Our sessions are not being recorded and that personal health information is protected. Our team would provide follow up care in person if/when the patient needs it. Patient does agree to proceed with telemedicine consultation. Patient's location: home address in [facility]. Physician  location [facility]. other people involved in call none.     Time spent: Not billed by time    This visit was completed virtually using Doxy. barron Ortega, was evaluated through a synchronous (real-time (A/V encounter. The patient (or guardian if applicable (is aware that this is a billable service, which includes applicable co-pays. The virtual visit was conducted with patient's 9 and/or legal guardians (consent. The visit was conducted pursuant to the emergency declaration under the [place] act and the [place], [address] [place] [address] authority and the coronavirus preparedness and response supplemental appropriations act. Patient identification was verified, and a caregiver was present when appropriate. The patient was located in a state where the provider was licensed to provide care.

## 2022-12-15 NOTE — PATIENT INSTRUCTIONS
Patient Education        Neck Strain or Sprain: Rehab Exercises  Introduction  Here are some examples of exercises for you to try. The exercises may be suggested for a condition or for rehabilitation. Start each exercise slowly. Ease off the exercises if you start to have pain. You will be told when to start these exercises and which ones will work best for you. How to do the exercises  Neck rotation   Sit in a firm chair, or stand up straight. Keeping your chin level, turn your head to the right, and hold for 15 to 30 seconds. Turn your head to the left and hold for 15 to 30 seconds. Repeat 2 to 4 times to each side. Neck stretches   Look straight ahead, and tip your right ear to your right shoulder. Do not let your left shoulder rise up as you tip your head to the right. Hold for 15 to 30 seconds. Tilt your head to the left. Do not let your right shoulder rise up as you tip your head to the left. Hold for 15 to 30 seconds. Repeat 2 to 4 times to each side. Forward neck flexion   Sit in a firm chair, or stand up straight. Bend your head forward. Hold for 15 to 30 seconds. Repeat 2 to 4 times. Lateral (side) bend strengthening   With your right hand, place your first two fingers on your right temple. Start to bend your head to the side while using gentle pressure from your fingers to keep your head from bending. Hold for about 6 seconds. Repeat 8 to 12 times. Switch hands and repeat the same exercise on your left side. Forward bend strengthening   Place your first two fingers of either hand on your forehead. Start to bend your head forward while using gentle pressure from your fingers to keep your head from bending. Hold for about 6 seconds. Repeat 8 to 12 times. Neutral position strengthening   Using one hand, place your fingertips on the back of your head at the top of your neck.   Start to bend your head backward while using gentle pressure from your fingers to keep your head from bending. Hold for about 6 seconds. Repeat 8 to 12 times. Chin tuck   Lie on the floor with a rolled-up towel under your neck. Your head should be touching the floor. Slowly bring your chin toward your chest.  Hold for a count of 6, and then relax for up to 10 seconds. Repeat 8 to 12 times. Follow-up care is a key part of your treatment and safety. Be sure to make and go to all appointments, and call your doctor if you are having problems. It's also a good idea to know your test results and keep a list of the medicines you take. Where can you learn more? Go to [email or web address].[email or web address] org and sign in to your Metago account. Enter M679 in the ServiceMesh box to learn more about \"Neck Strain or Sprain: Rehab Exercises. \"     If you do not have an account, please click on the \"Sign Up Now\" link. Current as of: November 16, 2020               Content Version: 12.8  © 2006-2021 Healthwise, Incorporated. Care instructions adapted under license by [facility] ([facility]). If you have questions about a medical condition or this instruction, always ask your healthcare professional. [place] [address] any warranty or liability for your use of this information.

## 2022-12-16 ENCOUNTER — HOSPITAL ENCOUNTER (OUTPATIENT)
Age: 67
Discharge: HOME | End: 2022-12-16
Payer: COMMERCIAL

## 2022-12-16 ENCOUNTER — HOSPITAL ENCOUNTER (OUTPATIENT)
Dept: GENERAL RADIOLOGY | Age: 67
End: 2022-12-16
Payer: COMMERCIAL

## 2022-12-16 DIAGNOSIS — M54.2 NECK PAIN ON RIGHT SIDE: ICD-10-CM

## 2022-12-16 PROCEDURE — 72040 X-RAY EXAM NECK SPINE 2-3 VW: CPT

## 2022-12-19 ENCOUNTER — TELEPHONE (OUTPATIENT)
Dept: PRIMARY CARE CLINIC | Age: 67
End: 2022-12-19

## 2022-12-19 NOTE — TELEPHONE ENCOUNTER
Left message cerv x ray shows mod to severe DDD. Continue med and exercises and follow up if worsens.

## 2023-01-23 ENCOUNTER — TELEPHONE (OUTPATIENT)
Dept: PRIMARY CARE CLINIC | Age: 68
End: 2023-01-23

## 2023-01-23 NOTE — TELEPHONE ENCOUNTER
Pt called and wanted to know if he can get an order for an MRI or cat scan of his back in regards to his ongoing back pain. Pt has an appointment for Thursday with you. Should pt keep the appointment, or is he able to get the scan without being seen. Please advise.

## 2023-01-27 ENCOUNTER — HOSPITAL ENCOUNTER (OUTPATIENT)
Age: 68
Discharge: HOME | End: 2023-01-29

## 2023-01-27 ENCOUNTER — HOSPITAL ENCOUNTER (OUTPATIENT)
Dept: GENERAL RADIOLOGY | Age: 68
Discharge: HOME | End: 2023-01-27
Payer: COMMERCIAL

## 2023-01-27 DIAGNOSIS — R52 PAIN: ICD-10-CM

## 2023-01-27 PROCEDURE — 72100 X-RAY EXAM L-S SPINE 2/3 VWS: CPT

## 2023-01-30 ENCOUNTER — TELEPHONE (OUTPATIENT)
Dept: PRIMARY CARE CLINIC | Age: 68
End: 2023-01-30

## 2023-01-30 NOTE — TELEPHONE ENCOUNTER
Pt called stating that he has had a cpap machine for about 20 years now and he is wanting a new one. The old one does not fit him anymore because he has lost weight. They have a new one out that auto adjusts to yourself. They told him that PCP has to order this. Please advise.      [phone]

## 2023-02-01 ENCOUNTER — OFFICE VISIT (OUTPATIENT)
Dept: PRIMARY CARE CLINIC | Age: 68
End: 2023-02-01
Payer: COMMERCIAL

## 2023-02-01 ENCOUNTER — HOSPITAL ENCOUNTER (OUTPATIENT)
Age: 68
Discharge: HOME | End: 2023-02-01
Payer: COMMERCIAL

## 2023-02-01 VITALS
OXYGEN SATURATION: 99 % | WEIGHT: 161 LBS | HEART RATE: 74 BPM | DIASTOLIC BLOOD PRESSURE: 68 MMHG | BODY MASS INDEX: 23.1 KG/M2 | TEMPERATURE: 97.5 F | SYSTOLIC BLOOD PRESSURE: 130 MMHG

## 2023-02-01 DIAGNOSIS — G31.84 MCI (MILD COGNITIVE IMPAIRMENT): ICD-10-CM

## 2023-02-01 DIAGNOSIS — G47.33 OBSTRUCTIVE SLEEP APNEA SYNDROME: Primary | ICD-10-CM

## 2023-02-01 DIAGNOSIS — Z12.5 SCREENING PSA (PROSTATE SPECIFIC ANTIGEN): ICD-10-CM

## 2023-02-01 DIAGNOSIS — E78.00 PURE HYPERCHOLESTEROLEMIA: ICD-10-CM

## 2023-02-01 LAB
ALBUMIN SERPL-MCNC: 5 G/DL (ref 3.5–5.2)
ALP BLD-CCNC: 67 U/L (ref 40–129)
ALT SERPL-CCNC: 14 U/L (ref 0–40)
ANION GAP SERPL CALCULATED.3IONS-SCNC: 11 MMOL/L (ref 7–16)
AST SERPL-CCNC: 20 U/L (ref 0–39)
BILIRUB SERPL-MCNC: 0.4 MG/DL (ref 0–1.2)
BUN BLDV-MCNC: 22 MG/DL (ref 6–23)
CALCIUM SERPL-MCNC: 10.4 MG/DL (ref 8.6–10.2)
CHLORIDE BLD-SCNC: 100 MMOL/L (ref 98–107)
CHOLESTEROL, TOTAL: 268 MG/DL (ref 0–199)
CO2: 27 MMOL/L (ref 22–29)
CREAT SERPL-MCNC: 0.8 MG/DL (ref 0.7–1.2)
FOLATE: >20 NG/ML (ref 4.8–24.2)
GFR SERPL CREATININE-BSD FRML MDRD: >60 ML/MIN/1.73
GLUCOSE BLD-MCNC: 98 MG/DL (ref 74–99)
HCT VFR BLD CALC: 44.8 % (ref 37–54)
HDLC SERPL-MCNC: 102 MG/DL
HEMOGLOBIN: 15.4 G/DL (ref 12.5–16.5)
LDL CHOLESTEROL CALCULATED: 150 MG/DL (ref 0–99)
MCH RBC QN AUTO: 31.4 PG (ref 26–35)
MCHC RBC AUTO-ENTMCNC: 34.4 % (ref 32–34.5)
MCV RBC AUTO: 91.2 FL (ref 80–99.9)
PDW BLD-RTO: 12.9 FL (ref 11.5–15)
PLATELET # BLD: 307 E9/L (ref 130–450)
PMV BLD AUTO: 10 FL (ref 7–12)
POTASSIUM SERPL-SCNC: 4.3 MMOL/L (ref 3.5–5)
PROSTATE SPECIFIC ANTIGEN: 1.67 NG/ML (ref 0–4)
RBC # BLD: 4.91 E12/L (ref 3.8–5.8)
SODIUM BLD-SCNC: 138 MMOL/L (ref 132–146)
TOTAL PROTEIN: 7.6 G/DL (ref 6.4–8.3)
TRIGL SERPL-MCNC: 82 MG/DL (ref 0–149)
TSH SERPL DL<=0.05 MIU/L-ACNC: 2.29 UIU/ML (ref 0.27–4.2)
VITAMIN B-12: 630 PG/ML (ref 211–946)
VLDLC SERPL CALC-MCNC: 16 MG/DL
WBC # BLD: 5.8 E9/L (ref 4.5–11.5)

## 2023-02-01 PROCEDURE — G0103 PSA SCREENING: HCPCS

## 2023-02-01 PROCEDURE — 99214 OFFICE O/P EST MOD 30 MIN: CPT | Performed by: FAMILY MEDICINE

## 2023-02-01 PROCEDURE — 36415 COLL VENOUS BLD VENIPUNCTURE: CPT

## 2023-02-01 PROCEDURE — 84443 ASSAY THYROID STIM HORMONE: CPT

## 2023-02-01 PROCEDURE — 80053 COMPREHEN METABOLIC PANEL: CPT

## 2023-02-01 PROCEDURE — 80061 LIPID PANEL: CPT

## 2023-02-01 PROCEDURE — 82607 VITAMIN B-12: CPT

## 2023-02-01 PROCEDURE — 86592 SYPHILIS TEST NON-TREP QUAL: CPT

## 2023-02-01 PROCEDURE — 85027 COMPLETE CBC AUTOMATED: CPT

## 2023-02-01 PROCEDURE — 82746 ASSAY OF FOLIC ACID SERUM: CPT

## 2023-02-01 PROCEDURE — 1123F ACP DISCUSS/DSCN MKR DOCD: CPT | Performed by: FAMILY MEDICINE

## 2023-02-01 ASSESSMENT — ENCOUNTER SYMPTOMS: SHORTNESS OF BREATH: 0

## 2023-02-01 NOTE — PROGRESS NOTES
Shira Degroot, a male of 79 y.o. came to the office 2/1/2023. Patient Active Problem List   Diagnosis    Lumbar radiculopathy    Intractable low back pain    DDD (degenerative disc disease), lumbar    Sleep apnea    Pure hypercholesterolemia          Hyperlipidemia  This is a chronic problem. The current episode started more than 1 year ago. The problem is uncontrolled. Pertinent negatives include no chest pain, leg pain, myalgias or shortness of breath. He is currently on no antihyperlipidemic treatment. PANCHO: wears cpap but needs new one. One now has too much pressure. Memory change: wife noticing. Works at [employer] as [employer]. Short term memory loss. Allergies   Allergen Reactions    Morphine Nausea And Vomiting       Current Outpatient Medications on File Prior to Visit   Medication Sig Dispense Refill    meloxicam (MOBIC) 15 MG tablet Take 1 tablet by mouth daily as needed for Pain 30 tablet 5    Multiple Vitamins-Minerals (MENS 50+ MULTI VITAMIN/MIN PO) Take by mouth daily      NONFORMULARY nugenix      Turmeric 500 MG CAPS Take 1,000 mg by mouth daily      Omega-3 Fatty Acids (FISH OIL) 1000 MG CPDR Take 3,000 mg by mouth daily      NONFORMULARY Pure nature      Apoaequorin (PREVAGEN PO) Take by mouth daily      aspirin 81 MG EC tablet Take 81 mg by mouth daily      Glucosamine-Chondroitin-MSM (TRIPLE FLEX PO) Take by mouth daily       No current facility-administered medications on file prior to visit. Review of Systems   Respiratory:  Negative for shortness of breath. Cardiovascular:  Negative for chest pain. Musculoskeletal:  Negative for myalgias. Psychiatric/Behavioral:  Negative for confusion and dysphoric mood. The patient is not nervous/anxious.          Mind racing   other review of systems reviewed and are negative    OBJECTIVE:  /68   Pulse 74   Temp 97.5 °F (36.4 °C)   Wt 161 lb (73 kg)   SpO2 99%   BMI 23.10 kg/m²      Physical Exam  Constitutional:  General: He is not in acute distress.  Eyes:      General: No scleral icterus.     Conjunctiva/sclera: Conjunctivae normal.   Neck:      Thyroid: No thyromegaly.   Cardiovascular:      Rate and Rhythm: Normal rate and regular rhythm.      Heart sounds: No murmur heard.  Pulmonary:      Effort: Pulmonary effort is normal.      Breath sounds: Normal breath sounds.   Musculoskeletal:      Cervical back: Neck supple.   Lymphadenopathy:      Cervical: No cervical adenopathy.   Skin:     General: Skin is warm and dry.   Neurological:      Mental Status: He is alert and oriented to person, place, and time.   Psychiatric:         Mood and Affect: Mood normal.   2/3 short term recall     ASSESSMENT AND PLAN:    Yobany was seen today for referral - general.    Diagnoses and all orders for this visit:    Obstructive sleep apnea syndrome  -     [facility] - Simin Brito DO, Sleep Medicine, [place]    Screening PSA (prostate specific antigen)  -     PSA Screening; Future    Pure hypercholesterolemia  -     Lipid Panel; Future    MCI (mild cognitive impairment)  -     CBC; Future  -     Comprehensive Metabolic Panel; Future  -     TSH; Future  -     RPR; Future  -     Vitamin B12 & Folate; Future  -     CT HEAD WO CONTRAST; Future  - low chol diet   - consider statin based on labs.     Return if symptoms worsen or fail to improve, for based on lab results.    Dwaine Clarke,

## 2023-02-02 ENCOUNTER — TELEPHONE (OUTPATIENT)
Dept: PRIMARY CARE CLINIC | Age: 68
End: 2023-02-02

## 2023-02-02 DIAGNOSIS — G47.33 OBSTRUCTIVE SLEEP APNEA SYNDROME: Primary | ICD-10-CM

## 2023-02-02 LAB — RPR: NORMAL

## 2023-02-02 NOTE — TELEPHONE ENCOUNTER
----- Message from Spencer Guzmán sent at 2/2/2023 10:16 AM EST -----  Subject: Message to Provider    QUESTIONS  Information for Provider? pt was calling regarding Ct scan apt.   ---------------------------------------------------------------------------  --------------  Fernando Painter INFO  [phone]; OK to leave message on voicemail  ---------------------------------------------------------------------------  --------------  SCRIPT ANSWERS  Relationship to Patient?  Self

## 2023-02-02 NOTE — TELEPHONE ENCOUNTER
----- Message from Halley Schroeder sent at 2/2/2023 11:22 AM EST -----  Subject: Message to Provider    QUESTIONS  Information for Provider? follow up on message about CT, he is wanting it   sent to [facility] as he is comfortable with them, please send over there.     ---------------------------------------------------------------------------  --------------  [address]  [phone]; OK to leave message on voicemail  ---------------------------------------------------------------------------  --------------  SCRIPT ANSWERS  Relationship to Patient?  Self

## 2023-02-03 ENCOUNTER — TELEPHONE (OUTPATIENT)
Dept: PRIMARY CARE CLINIC | Age: 68
End: 2023-02-03

## 2023-02-03 NOTE — TELEPHONE ENCOUNTER
Called patient discuss cholesterol. Elevated LDL and total cholesterol but also his HDL is over 100 so no medication needed at this time.

## 2023-02-16 DIAGNOSIS — G31.84 MCI (MILD COGNITIVE IMPAIRMENT): ICD-10-CM

## 2023-02-20 ENCOUNTER — TELEPHONE (OUTPATIENT)
Dept: PRIMARY CARE CLINIC | Age: 68
End: 2023-02-20

## 2023-02-20 DIAGNOSIS — Z12.11 ENCOUNTER FOR SCREENING COLONOSCOPY: Primary | ICD-10-CM

## 2023-02-20 NOTE — TELEPHONE ENCOUNTER
Discussed CAT scan of the brain which showed no abnormalities. Therefore continue to monitor issues. Follow-up as needed.

## 2023-04-04 ENCOUNTER — TELEPHONE (OUTPATIENT)
Dept: PRIMARY CARE CLINIC | Age: 68
End: 2023-04-04

## 2023-04-04 DIAGNOSIS — G47.33 OBSTRUCTIVE SLEEP APNEA SYNDROME: Primary | ICD-10-CM

## 2023-04-19 ENCOUNTER — TELEPHONE (OUTPATIENT)
Dept: PRIMARY CARE CLINIC | Age: 68
End: 2023-04-19

## 2023-04-19 DIAGNOSIS — G47.33 OBSTRUCTIVE SLEEP APNEA SYNDROME: Primary | ICD-10-CM

## 2023-04-19 NOTE — TELEPHONE ENCOUNTER
Abhay Campos from sleep study called and said you were suppose to order a auto  for the patient to do at home he can get thru [facility] there phone is [phone]

## 2023-06-01 DIAGNOSIS — M54.2 NECK PAIN ON RIGHT SIDE: ICD-10-CM

## 2023-06-01 RX ORDER — MELOXICAM 15 MG/1
15 TABLET ORAL DAILY PRN
Qty: 30 TABLET | Refills: 5 | Status: SHIPPED | OUTPATIENT
Start: 2023-06-01

## 2023-06-06 ENCOUNTER — TELEPHONE (OUTPATIENT)
Dept: PRIMARY CARE CLINIC | Age: 68
End: 2023-06-06

## 2023-06-06 NOTE — TELEPHONE ENCOUNTER
Nurse Triage: Pt called and wanted to know if a medication can be called in for his poison ivy. Pt mowed the lawn and broke out in poison ivy covering his arms, legs, groin, and back. Advised pt to go to urgent care but pt refused and wanted to wait for you to call something in instead. Pt made an appointment but wants to cancel if he can. Please advise, [place] [facility] [id number] [address].

## 2023-06-07 ENCOUNTER — OFFICE VISIT (OUTPATIENT)
Dept: PRIMARY CARE CLINIC | Age: 68
End: 2023-06-07
Payer: COMMERCIAL

## 2023-06-07 VITALS
HEIGHT: 70 IN | SYSTOLIC BLOOD PRESSURE: 130 MMHG | WEIGHT: 162 LBS | OXYGEN SATURATION: 98 % | TEMPERATURE: 97.2 F | BODY MASS INDEX: 23.19 KG/M2 | HEART RATE: 67 BPM | DIASTOLIC BLOOD PRESSURE: 70 MMHG

## 2023-06-07 DIAGNOSIS — R09.81 NASAL CONGESTION: ICD-10-CM

## 2023-06-07 DIAGNOSIS — L23.7 POISON IVY: Primary | ICD-10-CM

## 2023-06-07 PROCEDURE — 99213 OFFICE O/P EST LOW 20 MIN: CPT | Performed by: FAMILY MEDICINE

## 2023-06-07 PROCEDURE — 1123F ACP DISCUSS/DSCN MKR DOCD: CPT | Performed by: FAMILY MEDICINE

## 2023-06-07 RX ORDER — FLUTICASONE PROPIONATE 50 MCG
2 SPRAY, SUSPENSION (ML) NASAL DAILY
Qty: 16 G | Refills: 3 | Status: SHIPPED | OUTPATIENT
Start: 2023-06-07

## 2023-06-07 RX ORDER — METHYLPREDNISOLONE 4 MG/1
TABLET ORAL
Qty: 1 KIT | Refills: 0 | Status: SHIPPED | OUTPATIENT
Start: 2023-06-07

## 2023-06-07 SDOH — ECONOMIC STABILITY: INCOME INSECURITY: HOW HARD IS IT FOR YOU TO PAY FOR THE VERY BASICS LIKE FOOD, HOUSING, MEDICAL CARE, AND HEATING?: NOT HARD AT ALL

## 2023-06-07 SDOH — ECONOMIC STABILITY: HOUSING INSECURITY
IN THE LAST 12 MONTHS, WAS THERE A TIME WHEN YOU DID NOT HAVE A STEADY PLACE TO SLEEP OR SLEPT IN A SHELTER (INCLUDING NOW)?: NO

## 2023-06-07 SDOH — ECONOMIC STABILITY: FOOD INSECURITY: WITHIN THE PAST 12 MONTHS, THE FOOD YOU BOUGHT JUST DIDN'T LAST AND YOU DIDN'T HAVE MONEY TO GET MORE.: NEVER TRUE

## 2023-06-07 SDOH — ECONOMIC STABILITY: FOOD INSECURITY: WITHIN THE PAST 12 MONTHS, YOU WORRIED THAT YOUR FOOD WOULD RUN OUT BEFORE YOU GOT MONEY TO BUY MORE.: NEVER TRUE

## 2023-06-07 NOTE — PROGRESS NOTES
He is not in acute distress. Appearance: Normal appearance. He is not ill-appearing, toxic-appearing or diaphoretic. HENT:      Head: Normocephalic. Nose: No nasal tenderness, mucosal edema, congestion or rhinorrhea. Eyes:      General: No scleral icterus. Pulmonary:      Effort: Pulmonary effort is normal.   Skin:     Comments: Very Few Scattered papular/pustular lesions on R leg, abd and back   Neurological:      Mental Status: He is alert and oriented to person, place, and time. Psychiatric:         Mood and Affect: Mood normal.       ASSESSMENT AND PLAN:    Jeanne Garcia was seen today for poison ivy and sinusitis. Diagnoses and all orders for this visit:    Poison ivy  -     methylPREDNISolone (MEDROL DOSEPACK) 4 MG tablet; Take by mouth, as directed. Nasal congestion  -     fluticasone (FLONASE) 50 MCG/ACT nasal spray; 2 sprays by Each Nostril route daily        Return if symptoms worsen or fail to improve.     Nnamdi Clarke, DO

## 2023-07-14 ENCOUNTER — OFFICE VISIT (OUTPATIENT)
Dept: PRIMARY CARE CLINIC | Age: 68
End: 2023-07-14
Payer: COMMERCIAL

## 2023-07-14 VITALS — TEMPERATURE: 97.9 F | HEART RATE: 71 BPM | DIASTOLIC BLOOD PRESSURE: 76 MMHG | SYSTOLIC BLOOD PRESSURE: 133 MMHG

## 2023-07-14 DIAGNOSIS — M25.561 ACUTE PAIN OF RIGHT KNEE: ICD-10-CM

## 2023-07-14 DIAGNOSIS — M25.561 ACUTE PAIN OF RIGHT KNEE: Primary | ICD-10-CM

## 2023-07-14 LAB
BASOPHILS # BLD: 0.07 E9/L (ref 0–0.2)
BASOPHILS NFR BLD: 1.1 % (ref 0–2)
CRP SERPL HS-MCNC: <0.3 MG/DL (ref 0–0.4)
EOSINOPHIL # BLD: 0.24 E9/L (ref 0.05–0.5)
EOSINOPHIL NFR BLD: 3.6 % (ref 0–6)
ERYTHROCYTE [DISTWIDTH] IN BLOOD BY AUTOMATED COUNT: 12.8 FL (ref 11.5–15)
ERYTHROCYTE [SEDIMENTATION RATE] IN BLOOD BY WESTERGREN METHOD: 3 MM/HR (ref 0–15)
HCT VFR BLD AUTO: 41.3 % (ref 37–54)
HGB BLD-MCNC: 13.7 G/DL (ref 12.5–16.5)
IMM GRANULOCYTES # BLD: 0.02 E9/L
IMM GRANULOCYTES NFR BLD: 0.3 % (ref 0–5)
LYMPHOCYTES # BLD: 1.51 E9/L (ref 1.5–4)
LYMPHOCYTES NFR BLD: 22.9 % (ref 20–42)
MCH RBC QN AUTO: 31.5 PG (ref 26–35)
MCHC RBC AUTO-ENTMCNC: 33.2 % (ref 32–34.5)
MCV RBC AUTO: 94.9 FL (ref 80–99.9)
MONOCYTES # BLD: 0.47 E9/L (ref 0.1–0.95)
MONOCYTES NFR BLD: 7.1 % (ref 2–12)
NEUTROPHILS # BLD: 4.29 E9/L (ref 1.8–7.3)
NEUTS SEG NFR BLD: 65 % (ref 43–80)
PLATELET # BLD AUTO: 300 E9/L (ref 130–450)
PMV BLD AUTO: 10.4 FL (ref 7–12)
RBC # BLD AUTO: 4.35 E12/L (ref 3.8–5.8)
URATE SERPL-MCNC: 3.5 MG/DL (ref 3.4–7)
WBC # BLD: 6.6 E9/L (ref 4.5–11.5)

## 2023-07-14 PROCEDURE — 99214 OFFICE O/P EST MOD 30 MIN: CPT | Performed by: NEUROMUSCULOSKELETAL MEDICINE & OMM

## 2023-07-14 PROCEDURE — 1123F ACP DISCUSS/DSCN MKR DOCD: CPT | Performed by: NEUROMUSCULOSKELETAL MEDICINE & OMM

## 2023-07-14 RX ORDER — METHYLPREDNISOLONE 4 MG/1
TABLET ORAL
Qty: 1 KIT | Refills: 0 | Status: SHIPPED | OUTPATIENT
Start: 2023-07-14

## 2023-07-14 RX ORDER — METHYLPREDNISOLONE 4 MG/1
TABLET ORAL
Qty: 1 KIT | Refills: 0 | Status: SHIPPED
Start: 2023-07-14 | End: 2023-07-14 | Stop reason: SDUPTHER

## 2023-07-14 ASSESSMENT — ENCOUNTER SYMPTOMS
COUGH: 0
CHOKING: 0
BACK PAIN: 0
SHORTNESS OF BREATH: 0
COLOR CHANGE: 0
CHEST TIGHTNESS: 0

## 2023-07-14 NOTE — PROGRESS NOTES
Avani Ly (:  1955) is a 79 y.o. male,Established patient, here for evaluation of the following chief complaint(s):  Knee Pain (right)         ASSESSMENT/PLAN:  1. Acute pain of right knee  Comments: We will check right knee x-ray and order labs CBC with differential, uric acid, sed rate, and CRP to rule out inflammatory arthritis/gout. Orders:  -     CBC with Auto Differential; Future  -     C-Reactive Protein; Future  -     Sedimentation Rate; Future  -     Uric Acid; Future  -     XR KNEE RIGHT (MIN 4 VIEWS); Future  -     methylPREDNISolone (MEDROL DOSEPACK) 4 MG tablet; Take by mouth, as directed., Disp-1 kit, R-0Normal      Return if symptoms worsen or fail to improve. Subjective   SUBJECTIVE/OBJECTIVE:  HPI 62-year-old male was seen in walk-in clinic for right knee pain and swelling over the last 24 to 36 hours. Patient states that he was putting a new belt on his 0 turn mower yesterday soon after that he started having pain and swelling of the right knee. It became worse when he woke up this morning. He did take an Aleve last night and that did not help at all. Patient has diffuse arthritis throughout his body per his wife. Patient denies fever, sweats, chills. Although, he rates his pain a 12 out of 10 in intensity and his wife says his pain threshold is pretty high. I will work him up for an inflammatory arthritis including CBC with differential, uric acid, sed rate, and CRP. We will also obtain right knee x-ray. Medications: I will give him a Medrol Dosepak to see if this does not calm down the pain and swelling. He is to follow-up with his PCP in 5 to 7 days if no better or signs or symptoms continue to worsen. I also told him to wear an Ace wrap for stabilization of the right knee until x-rays are returned. Review of Systems   Constitutional:  Negative for activity change, appetite change, chills, diaphoresis, fatigue and fever.    Respiratory:  Negative for cough,

## 2023-08-01 ENCOUNTER — OFFICE VISIT (OUTPATIENT)
Dept: PRIMARY CARE CLINIC | Age: 68
End: 2023-08-01
Payer: COMMERCIAL

## 2023-08-01 VITALS
WEIGHT: 158.5 LBS | DIASTOLIC BLOOD PRESSURE: 80 MMHG | HEIGHT: 70 IN | BODY MASS INDEX: 22.69 KG/M2 | HEART RATE: 68 BPM | OXYGEN SATURATION: 98 % | SYSTOLIC BLOOD PRESSURE: 122 MMHG | TEMPERATURE: 97.4 F | RESPIRATION RATE: 16 BRPM

## 2023-08-01 DIAGNOSIS — M17.11 PRIMARY OSTEOARTHRITIS OF RIGHT KNEE: Primary | ICD-10-CM

## 2023-08-01 PROCEDURE — 99213 OFFICE O/P EST LOW 20 MIN: CPT | Performed by: FAMILY MEDICINE

## 2023-08-01 PROCEDURE — 1123F ACP DISCUSS/DSCN MKR DOCD: CPT | Performed by: FAMILY MEDICINE

## 2023-08-09 ENCOUNTER — TELEPHONE (OUTPATIENT)
Dept: PRIMARY CARE CLINIC | Age: 68
End: 2023-08-09

## 2023-08-09 RX ORDER — CELECOXIB 200 MG/1
200 CAPSULE ORAL DAILY
Qty: 30 CAPSULE | Refills: 2 | Status: SHIPPED | OUTPATIENT
Start: 2023-08-09

## 2023-08-09 NOTE — TELEPHONE ENCOUNTER
Pt called today, said new med, Diclofenac, is making him sick. Asking for something different?  Said he had breakthrough pain with previous med, meloxicam.    [facility]

## 2023-09-06 DIAGNOSIS — M17.11 PRIMARY OSTEOARTHRITIS OF RIGHT KNEE: ICD-10-CM

## 2023-09-11 ENCOUNTER — TELEPHONE (OUTPATIENT)
Dept: PRIMARY CARE CLINIC | Age: 68
End: 2023-09-11

## 2023-09-11 NOTE — TELEPHONE ENCOUNTER
Pt called and wanted to know if he can take a Medrol dosepak for poison ivy that he got this weekend. Received a medrol elsie from Dr. Moiz Cerrato but wasn't sure if it would help poison ivy. Please advise, will call pt back with information.

## 2023-09-18 DIAGNOSIS — R09.81 NASAL CONGESTION: ICD-10-CM

## 2023-09-18 RX ORDER — FLUTICASONE PROPIONATE 50 MCG
2 SPRAY, SUSPENSION (ML) NASAL DAILY
Qty: 48 G | Refills: 2 | Status: SHIPPED | OUTPATIENT
Start: 2023-09-18

## 2023-10-16 DIAGNOSIS — R09.81 NASAL CONGESTION: ICD-10-CM

## 2023-10-16 RX ORDER — FLUTICASONE PROPIONATE 50 MCG
2 SPRAY, SUSPENSION (ML) NASAL DAILY
Qty: 48 G | Refills: 2 | Status: SHIPPED | OUTPATIENT
Start: 2023-10-16

## 2024-01-03 ENCOUNTER — TELEPHONE (OUTPATIENT)
Dept: PRIMARY CARE CLINIC | Age: 69
End: 2024-01-03

## 2024-01-03 NOTE — TELEPHONE ENCOUNTER
Pt called and states he has had ongoing sinus infection. Clear congestion, cough, nasal drainage. Ongoing for a few weeks with no relief from dayquil, nyquil, tylenol, or otc cough syrup. Denies fever, headaches, SOB, and COVID negative. Pt not sure if he needs to be seen or if something can be called in, please advise will call pt back with information.

## 2024-01-05 ENCOUNTER — OFFICE VISIT (OUTPATIENT)
Dept: PRIMARY CARE CLINIC | Age: 69
End: 2024-01-05
Payer: COMMERCIAL

## 2024-01-05 VITALS
HEART RATE: 67 BPM | RESPIRATION RATE: 14 BRPM | SYSTOLIC BLOOD PRESSURE: 130 MMHG | OXYGEN SATURATION: 96 % | DIASTOLIC BLOOD PRESSURE: 84 MMHG | BODY MASS INDEX: 23.05 KG/M2 | TEMPERATURE: 97.5 F | WEIGHT: 161 LBS | HEIGHT: 70 IN

## 2024-01-05 DIAGNOSIS — J01.30 ACUTE NON-RECURRENT SPHENOIDAL SINUSITIS: Primary | ICD-10-CM

## 2024-01-05 PROCEDURE — 99213 OFFICE O/P EST LOW 20 MIN: CPT | Performed by: FAMILY MEDICINE

## 2024-01-05 PROCEDURE — 1123F ACP DISCUSS/DSCN MKR DOCD: CPT | Performed by: FAMILY MEDICINE

## 2024-01-05 RX ORDER — AMOXICILLIN 500 MG/1
500 CAPSULE ORAL 3 TIMES DAILY
Qty: 30 CAPSULE | Refills: 0 | Status: SHIPPED | OUTPATIENT
Start: 2024-01-05 | End: 2024-01-15

## 2024-01-05 ASSESSMENT — PATIENT HEALTH QUESTIONNAIRE - PHQ9
SUM OF ALL RESPONSES TO PHQ QUESTIONS 1-9: 0
1. LITTLE INTEREST OR PLEASURE IN DOING THINGS: 0
SUM OF ALL RESPONSES TO PHQ QUESTIONS 1-9: 0
2. FEELING DOWN, DEPRESSED OR HOPELESS: 0
SUM OF ALL RESPONSES TO PHQ9 QUESTIONS 1 & 2: 0
SUM OF ALL RESPONSES TO PHQ QUESTIONS 1-9: 0
SUM OF ALL RESPONSES TO PHQ QUESTIONS 1-9: 0

## 2024-01-05 ASSESSMENT — ENCOUNTER SYMPTOMS
SINUS PAIN: 0
HEMOPTYSIS: 0
SINUS PRESSURE: 1
SORE THROAT: 0
SHORTNESS OF BREATH: 0
WHEEZING: 0
COUGH: 1
RHINORRHEA: 1

## 2024-01-05 NOTE — PROGRESS NOTES
Yobany Maria, a male of 68 y.o. came to the office 1/5/2024.     Patient Active Problem List   Diagnosis    Lumbar radiculopathy    Intractable low back pain    DDD (degenerative disc disease), lumbar    Sleep apnea    Pure hypercholesterolemia          Cough  This is a new problem. The current episode started in the past 7 days (4). The problem has been gradually worsening. The cough is Productive of purulent sputum (yellow green). Associated symptoms include nasal congestion, postnasal drip and rhinorrhea. Pertinent negatives include no chills, ear pain, fever, headaches, hemoptysis, sore throat, shortness of breath or wheezing. Treatments tried: Mucinex, Nyquil, and Tylenol. The treatment provided no relief.        Allergies   Allergen Reactions    Morphine Nausea And Vomiting       Current Outpatient Medications on File Prior to Visit   Medication Sig Dispense Refill    diclofenac (VOLTAREN) 50 MG EC tablet TAKE 1 TABLET BY MOUTH TWICE DAILY 60 tablet 1    Multiple Vitamins-Minerals (MENS 50+ MULTI VITAMIN/MIN PO) Take by mouth daily      NONFORMULARY nugenix      Turmeric 500 MG CAPS Take 2 capsules by mouth daily      Omega-3 Fatty Acids (FISH OIL) 1000 MG CPDR Take 3 capsules by mouth daily      Apoaequorin (PREVAGEN PO) Take by mouth daily      aspirin 81 MG EC tablet Take 1 tablet by mouth daily       No current facility-administered medications on file prior to visit.       Review of Systems   Constitutional:  Negative for chills and fever.   HENT:  Positive for postnasal drip, rhinorrhea and sinus pressure (between eyes.). Negative for congestion, ear pain, sinus pain and sore throat.    Respiratory:  Positive for cough. Negative for hemoptysis, shortness of breath and wheezing.    Neurological:  Negative for headaches.     other review of systems reviewed and are negative    OBJECTIVE:  /84   Pulse 67   Temp 97.5 °F (36.4 °C)   Resp 14   Ht 1.778 m (5' 10\")   Wt 73 kg (161 lb)   SpO2 96%

## 2024-02-08 ENCOUNTER — TELEPHONE (OUTPATIENT)
Dept: PRIMARY CARE CLINIC | Age: 69
End: 2024-02-08

## 2024-02-08 RX ORDER — NAPROXEN 500 MG/1
500 TABLET ORAL 2 TIMES DAILY WITH MEALS
Qty: 30 TABLET | Refills: 1 | Status: SHIPPED | OUTPATIENT
Start: 2024-02-08

## 2024-02-08 NOTE — TELEPHONE ENCOUNTER
----- Message from Leonor Razialanie sent at 2/7/2024 11:35 AM EST -----  Subject: Refill Request    QUESTIONS  Name of Medication? Other - for back pain   Patient-reported dosage and instructions? na  How many days do you have left? 0  Preferred Pharmacy? [id number]  Pharmacy phone number (if available)? [phone]  Additional Information for Provider? pt has a trip to [place] with his   grandkids and would like something for back pain  He said it popped two   days and has been bad since then   ---------------------------------------------------------------------------  --------------  CALL BACK INFO  What is the best way for the office to contact you? OK to leave message on   voicemail  Preferred Call Back Phone Number? [phone]  ---------------------------------------------------------------------------  --------------  SCRIPT ANSWERS  Relationship to Patient? Self

## 2024-02-22 ENCOUNTER — OFFICE VISIT (OUTPATIENT)
Dept: PRIMARY CARE CLINIC | Age: 69
End: 2024-02-22
Payer: COMMERCIAL

## 2024-02-22 VITALS
WEIGHT: 161 LBS | BODY MASS INDEX: 23.05 KG/M2 | DIASTOLIC BLOOD PRESSURE: 88 MMHG | TEMPERATURE: 97.4 F | HEIGHT: 70 IN | HEART RATE: 75 BPM | SYSTOLIC BLOOD PRESSURE: 130 MMHG | OXYGEN SATURATION: 93 % | RESPIRATION RATE: 16 BRPM

## 2024-02-22 DIAGNOSIS — G30.9 ALZHEIMER'S DISEASE, UNSPECIFIED (CODE) (HCC): ICD-10-CM

## 2024-02-22 DIAGNOSIS — G31.84 MCI (MILD COGNITIVE IMPAIRMENT): ICD-10-CM

## 2024-02-22 DIAGNOSIS — Z12.5 SCREENING PSA (PROSTATE SPECIFIC ANTIGEN): ICD-10-CM

## 2024-02-22 DIAGNOSIS — Z00.00 INITIAL MEDICARE ANNUAL WELLNESS VISIT: Primary | ICD-10-CM

## 2024-02-22 DIAGNOSIS — Z13.6 ENCOUNTER FOR SCREENING FOR ABDOMINAL AORTIC ANEURYSM (AAA) IN PATIENT 50 YEARS OF AGE OR OLDER WITHOUT OTHER RISK FACTORS FOR AAA: ICD-10-CM

## 2024-02-22 DIAGNOSIS — E78.00 PURE HYPERCHOLESTEROLEMIA: ICD-10-CM

## 2024-02-22 LAB
ALBUMIN SERPL-MCNC: 4.8 G/DL (ref 3.5–5.2)
ALP BLD-CCNC: 64 U/L (ref 40–129)
ALT SERPL-CCNC: 16 U/L (ref 0–40)
ANION GAP SERPL CALCULATED.3IONS-SCNC: 13 MMOL/L (ref 7–16)
AST SERPL-CCNC: 21 U/L (ref 0–39)
BILIRUB SERPL-MCNC: 0.3 MG/DL (ref 0–1.2)
BUN BLDV-MCNC: 12 MG/DL (ref 6–23)
CALCIUM SERPL-MCNC: 10.1 MG/DL (ref 8.6–10.2)
CHLORIDE BLD-SCNC: 102 MMOL/L (ref 98–107)
CHOLESTEROL: 267 MG/DL
CO2: 27 MMOL/L (ref 22–29)
CREAT SERPL-MCNC: 0.8 MG/DL (ref 0.7–1.2)
GFR SERPL CREATININE-BSD FRML MDRD: >60 ML/MIN/1.73M2
GLUCOSE BLD-MCNC: 91 MG/DL (ref 74–99)
HDLC SERPL-MCNC: 82 MG/DL
LDL CHOLESTEROL: 174 MG/DL
POTASSIUM SERPL-SCNC: 4.7 MMOL/L (ref 3.5–5)
PROSTATE SPECIFIC ANTIGEN: 1.94 NG/ML (ref 0–4)
SODIUM BLD-SCNC: 142 MMOL/L (ref 132–146)
TOTAL PROTEIN: 7.6 G/DL (ref 6.4–8.3)
TRIGL SERPL-MCNC: 57 MG/DL
VLDLC SERPL CALC-MCNC: 11 MG/DL

## 2024-02-22 PROCEDURE — 1123F ACP DISCUSS/DSCN MKR DOCD: CPT | Performed by: FAMILY MEDICINE

## 2024-02-22 PROCEDURE — 36415 COLL VENOUS BLD VENIPUNCTURE: CPT | Performed by: FAMILY MEDICINE

## 2024-02-22 PROCEDURE — 99213 OFFICE O/P EST LOW 20 MIN: CPT | Performed by: FAMILY MEDICINE

## 2024-02-22 PROCEDURE — G0438 PPPS, INITIAL VISIT: HCPCS | Performed by: FAMILY MEDICINE

## 2024-02-22 RX ORDER — DONEPEZIL HYDROCHLORIDE 5 MG/1
5 TABLET, FILM COATED ORAL NIGHTLY
Qty: 90 TABLET | Refills: 0 | Status: SHIPPED | OUTPATIENT
Start: 2024-02-22

## 2024-02-22 RX ORDER — DONEPEZIL HYDROCHLORIDE 5 MG/1
5 TABLET, FILM COATED ORAL NIGHTLY
Qty: 30 TABLET | Refills: 1 | Status: SHIPPED
Start: 2024-02-22 | End: 2024-02-22

## 2024-02-22 ASSESSMENT — PATIENT HEALTH QUESTIONNAIRE - PHQ9
SUM OF ALL RESPONSES TO PHQ QUESTIONS 1-9: 1
2. FEELING DOWN, DEPRESSED OR HOPELESS: 1
SUM OF ALL RESPONSES TO PHQ QUESTIONS 1-9: 1
SUM OF ALL RESPONSES TO PHQ9 QUESTIONS 1 & 2: 1
1. LITTLE INTEREST OR PLEASURE IN DOING THINGS: 0

## 2024-02-22 ASSESSMENT — LIFESTYLE VARIABLES
HOW MANY STANDARD DRINKS CONTAINING ALCOHOL DO YOU HAVE ON A TYPICAL DAY: 1 OR 2
HOW OFTEN DO YOU HAVE A DRINK CONTAINING ALCOHOL: MONTHLY OR LESS

## 2024-02-22 ASSESSMENT — ENCOUNTER SYMPTOMS: SHORTNESS OF BREATH: 0

## 2024-02-22 NOTE — PROGRESS NOTES
Medicare Annual Wellness Visit    Yobany Maria is here for Medicare AWV    Assessment & Plan   Initial Medicare annual wellness visit  Screening PSA (prostate specific antigen)  -     PSA Screening  Pure hypercholesterolemia  -     Lipid Panel  -     Comprehensive Metabolic Panel  MCI (mild cognitive impairment)  -     donepezil (ARICEPT) 5 MG tablet; Take 1 tablet by mouth nightly, Disp-30 tablet, R-1Normal  -     MRI BRAIN WO CONTRAST; Future  Alzheimer's disease, unspecified  Encounter for screening for abdominal aortic aneurysm (AAA) in patient 50 years of age or older without other risk factors for AAA  -     US ABDOMINAL AORTA LIMITED; Future    Recommendations for Preventive Services Due: see orders and patient instructions/AVS.  Recommended screening schedule for the next 5-10 years is provided to the patient in written form: see Patient Instructions/AVS.     Return for Medicare Annual Wellness Visit in 1 year.     Subjective   The following acute and/or chronic problems were also addressed today:    Yobany Maria, a male of 69 y.o. came to the office 2/22/2024.     Patient Active Problem List   Diagnosis    Lumbar radiculopathy    Intractable low back pain    DDD (degenerative disc disease), lumbar    Sleep apnea    Pure hypercholesterolemia    Alzheimer's disease, unspecified          Memory Loss    Patient reports onset of memory loss was 1 to 6 months ago. Onset quality is gradual.     Symptoms associated with memory loss include changes in short-term memory, repetitive questions, day/night behavior changes and disorientation outside familiar environments. Symptoms do not include changes in long-term memory or difficulty recalling words.    Behavorial problems for memory loss include agitation.     The family does not monitor medication usage.     Patient lives with spouse. Patient lives in a/an own home.  Hyperlipidemia  This is a chronic problem. The current episode started more than 1 year ago. The

## 2024-02-22 NOTE — PATIENT INSTRUCTIONS
irregular heartbeat.   After you call 911, the  may tell you to chew 1 adult-strength or 2 to 4 low-dose aspirin. Wait for an ambulance. Do not try to drive yourself.  Watch closely for changes in your health, and be sure to contact your doctor if you have any problems.  Where can you learn more?  Go to https://www.[employer].net/patientEd and enter F075 to learn more about \"A Healthy Heart: Care Instructions.\"  Current as of: June 25, 2023               Content Version: 13.9  © 0233-0346 [employer].   Care instructions adapted under license by [employer]. If you have questions about a medical condition or this instruction, always ask your healthcare professional. [employer] disclaims any warranty or liability for your use of this information.      Personalized Preventive Plan for Yobany Maria - 2/22/2024  Medicare offers a range of preventive health benefits. Some of the tests and screenings are paid in full while other may be subject to a deductible, co-insurance, and/or copay.    Some of these benefits include a comprehensive review of your medical history including lifestyle, illnesses that may run in your family, and various assessments and screenings as appropriate.    After reviewing your medical record and screening and assessments performed today your provider may have ordered immunizations, labs, imaging, and/or referrals for you.  A list of these orders (if applicable) as well as your Preventive Care list are included within your After Visit Summary for your review.    Other Preventive Recommendations:    A preventive eye exam performed by an eye specialist is recommended every 1-2 years to screen for glaucoma; cataracts, macular degeneration, and other eye disorders.  A preventive dental visit is recommended every 6 months.  Try to get at least 150 minutes of exercise per week or 10,000 steps per day on a pedometer .  Order or download the FREE \"Exercise & Physical

## 2024-03-05 RX ORDER — NAPROXEN 500 MG/1
500 TABLET ORAL 2 TIMES DAILY WITH MEALS
Qty: 60 TABLET | Refills: 1 | Status: SHIPPED | OUTPATIENT
Start: 2024-03-05

## 2024-03-14 DIAGNOSIS — G31.84 MCI (MILD COGNITIVE IMPAIRMENT): ICD-10-CM

## 2024-03-22 ENCOUNTER — OFFICE VISIT (OUTPATIENT)
Dept: PRIMARY CARE CLINIC | Age: 69
End: 2024-03-22
Payer: COMMERCIAL

## 2024-03-22 VITALS
DIASTOLIC BLOOD PRESSURE: 80 MMHG | RESPIRATION RATE: 16 BRPM | BODY MASS INDEX: 23.19 KG/M2 | OXYGEN SATURATION: 98 % | HEIGHT: 70 IN | TEMPERATURE: 97.7 F | HEART RATE: 75 BPM | WEIGHT: 162 LBS | SYSTOLIC BLOOD PRESSURE: 120 MMHG

## 2024-03-22 DIAGNOSIS — M53.87 SCIATICA ASSOCIATED WITH DISORDER OF LUMBOSACRAL SPINE: Primary | ICD-10-CM

## 2024-03-22 PROCEDURE — 99213 OFFICE O/P EST LOW 20 MIN: CPT | Performed by: FAMILY MEDICINE

## 2024-03-22 PROCEDURE — 1123F ACP DISCUSS/DSCN MKR DOCD: CPT | Performed by: FAMILY MEDICINE

## 2024-03-22 NOTE — PROGRESS NOTES
Yobany Maria, a male of 69 y.o. came to the office 3/22/2024.     Patient Active Problem List   Diagnosis    Lumbar radiculopathy    Intractable low back pain    DDD (degenerative disc disease), lumbar    Sleep apnea    Pure hypercholesterolemia    Alzheimer's disease, unspecified          Hip Pain   The incident occurred 3 to 5 days ago. There was no injury mechanism. The pain is present in the right hip. The quality of the pain is described as stabbing. The pain is moderate. Pertinent negatives include no numbness or tingling. Associated symptoms comments: Radiating down side of R leg to foot.  . The symptoms are aggravated by movement (laying down). He has tried heat (not takng Naprosyn) for the symptoms. The treatment provided mild relief.        Allergies   Allergen Reactions    Morphine Nausea And Vomiting       Current Outpatient Medications on File Prior to Visit   Medication Sig Dispense Refill    naproxen (NAPROSYN) 500 MG tablet TAKE 1 TABLET BY MOUTH TWICE DAILY WITH MEALS 60 tablet 1    donepezil (ARICEPT) 5 MG tablet TAKE 1 TABLET BY MOUTH EVERY NIGHT 90 tablet 0    Multiple Vitamins-Minerals (MENS 50+ MULTI VITAMIN/MIN PO) Take by mouth daily      NONFORMULARY nugenix      Turmeric 500 MG CAPS Take 2 capsules by mouth daily      Omega-3 Fatty Acids (FISH OIL) 1000 MG CPDR Take 3 capsules by mouth daily      Apoaequorin (PREVAGEN PO) Take by mouth daily      aspirin 81 MG EC tablet Take 1 tablet by mouth daily      diclofenac (VOLTAREN) 50 MG EC tablet TAKE 1 TABLET BY MOUTH TWICE DAILY (Patient not taking: Reported on 3/22/2024) 60 tablet 1     No current facility-administered medications on file prior to visit.       Review of Systems   Neurological:  Negative for tingling and numbness.     other review of systems reviewed and are negative    OBJECTIVE:  /80   Pulse 75   Temp 97.7 °F (36.5 °C)   Resp 16   Ht 1.778 m (5' 10\")   Wt 73.5 kg (162 lb)   SpO2 98%   BMI 23.24 kg/m²

## 2024-04-04 ENCOUNTER — OFFICE VISIT (OUTPATIENT)
Dept: PRIMARY CARE CLINIC | Age: 69
End: 2024-04-04
Payer: COMMERCIAL

## 2024-04-04 VITALS
HEART RATE: 68 BPM | OXYGEN SATURATION: 97 % | BODY MASS INDEX: 23.62 KG/M2 | DIASTOLIC BLOOD PRESSURE: 84 MMHG | SYSTOLIC BLOOD PRESSURE: 152 MMHG | TEMPERATURE: 96.8 F | HEIGHT: 70 IN | WEIGHT: 165 LBS

## 2024-04-04 DIAGNOSIS — G31.84 MCI (MILD COGNITIVE IMPAIRMENT): ICD-10-CM

## 2024-04-04 DIAGNOSIS — M25.561 RIGHT KNEE PAIN, UNSPECIFIED CHRONICITY: Primary | ICD-10-CM

## 2024-04-04 PROCEDURE — 1123F ACP DISCUSS/DSCN MKR DOCD: CPT | Performed by: FAMILY MEDICINE

## 2024-04-04 PROCEDURE — 99213 OFFICE O/P EST LOW 20 MIN: CPT | Performed by: FAMILY MEDICINE

## 2024-04-04 RX ORDER — DONEPEZIL HYDROCHLORIDE 10 MG/1
10 TABLET, FILM COATED ORAL NIGHTLY
Qty: 30 TABLET | Refills: 3 | Status: SHIPPED
Start: 2024-04-04 | End: 2024-04-04

## 2024-04-04 RX ORDER — DONEPEZIL HYDROCHLORIDE 10 MG/1
10 TABLET, FILM COATED ORAL NIGHTLY
Qty: 90 TABLET | Refills: 0 | Status: SHIPPED | OUTPATIENT
Start: 2024-04-04

## 2024-04-04 NOTE — PATIENT INSTRUCTIONS
under your knee.)  Hold for about 6 seconds, then rest for up to 10 seconds.  Do 8 to 12 repetitions several times a day.    Straight-leg raises to the front   Lie on your back with your good knee bent so that your foot rests flat on the floor. Your injured leg should be straight. Make sure that your low back has a normal curve. You should be able to slip your flat hand in between the floor and the small of your back, with your palm touching the floor and your back touching the back of your hand.  Tighten the thigh muscles in the injured leg by pressing the back of your knee flat down to the floor. Hold your knee straight.  Keeping the thigh muscles tight, lift your injured leg up so that your heel is about 12 inches off the floor. Hold for about 6 seconds and then lower slowly.  Do 8 to 12 repetitions, 3 times a day.    Straight-leg raises to the outside   Lie on your side, with your injured leg on top.  Tighten the front thigh muscles of your injured leg to keep your knee straight.  Keep your hip and your leg straight in line with the rest of your body, and keep your knee pointing forward. Do not drop your hip back.  Lift your injured leg straight up toward the ceiling, about 12 inches off the floor. Hold for about 6 seconds, then slowly lower your leg.  Do 8 to 12 repetitions.    Straight-leg raises to the back   Lie on your stomach, and lift your leg straight up behind you (toward the ceiling).  Lift your toes about 6 inches off the floor, hold for about 6 seconds, then lower slowly.  Do 8 to 12 repetitions.    Straight-leg raises to the inside   Lie on the side of your body with the injured leg.  You can either prop your other (good) leg up on a chair, or you can bend your good knee and put that foot in front of your injured knee. Do not drop your hip back.  Tighten the muscles on the front of your thigh to straighten your injured knee.  Keep your kneecap pointing forward, and lift your whole leg up toward the

## 2024-04-04 NOTE — PROGRESS NOTES
Yobany Maria, a male of 69 y.o. came to the office 4/4/2024.     Patient Active Problem List   Diagnosis    Lumbar radiculopathy    Intractable low back pain    DDD (degenerative disc disease), lumbar    Sleep apnea    Pure hypercholesterolemia    Alzheimer's disease, unspecified          Leg Pain   There was no injury mechanism. The pain is present in the right leg (side of leg.). Quality: pulsating. The pain has been Fluctuating since onset. Pertinent negatives include no inability to bear weight, numbness or tingling. Exacerbated by: sitting.   Memory Loss    Patient reports onset of memory loss was more than 1 year ago. Onset quality is gradual.     Symptoms associated with memory loss include changes in long-term memory and repetitive questions. Symptoms do not include changes in short-term memory, difficulty recalling words, day/night behavior changes or disorientation outside familiar environments.    Patient does not have the following behavorial problems associated with memory loss: hallucinations, delusions or agitation.    The family and/or patient does not have the following concerns associated with memory loss: wandering or driving.    Patient lives with spouse. Patient lives in a/an own home.    Additional narrative: When playing dominos he would ask the same ? When it was his turn. Ie what are we looking for.   Knee Pain   There was no injury mechanism. The pain is present in the right knee. The quality of the pain is described as aching. Pertinent negatives include no inability to bear weight, numbness or tingling. He has tried NSAIDs for the symptoms.        Allergies   Allergen Reactions    Morphine Nausea And Vomiting       Current Outpatient Medications on File Prior to Visit   Medication Sig Dispense Refill    naproxen (NAPROSYN) 500 MG tablet TAKE 1 TABLET BY MOUTH TWICE DAILY WITH MEALS (Patient taking differently: Take 1 tablet by mouth 2 times daily as needed) 60 tablet 1    Multiple

## 2024-05-01 ENCOUNTER — TELEPHONE (OUTPATIENT)
Dept: PRIMARY CARE CLINIC | Age: 69
End: 2024-05-01

## 2024-05-01 NOTE — TELEPHONE ENCOUNTER
Patient called and wants you to know he is still sitting in his chair and wants to know if there is anything stronger?

## 2024-05-02 NOTE — PROGRESS NOTES
If still having issues I would recommend a steroid injection in his knee.  If he agrees he can set this up with us

## 2024-05-21 ENCOUNTER — OFFICE VISIT (OUTPATIENT)
Dept: PRIMARY CARE CLINIC | Age: 69
End: 2024-05-21

## 2024-05-21 VITALS
SYSTOLIC BLOOD PRESSURE: 130 MMHG | WEIGHT: 162 LBS | BODY MASS INDEX: 23.19 KG/M2 | HEART RATE: 64 BPM | DIASTOLIC BLOOD PRESSURE: 78 MMHG | TEMPERATURE: 97.6 F | HEIGHT: 70 IN | OXYGEN SATURATION: 98 %

## 2024-05-21 DIAGNOSIS — S80.12XA CONTUSION OF LEFT LOWER LEG, INITIAL ENCOUNTER: ICD-10-CM

## 2024-05-21 DIAGNOSIS — M17.11 PRIMARY OSTEOARTHRITIS OF RIGHT KNEE: Primary | ICD-10-CM

## 2024-05-21 RX ORDER — METHYLPREDNISOLONE ACETATE 80 MG/ML
80 INJECTION, SUSPENSION INTRA-ARTICULAR; INTRALESIONAL; INTRAMUSCULAR; SOFT TISSUE ONCE
Status: COMPLETED | OUTPATIENT
Start: 2024-05-21 | End: 2024-05-21

## 2024-05-21 RX ADMIN — METHYLPREDNISOLONE ACETATE 80 MG: 80 INJECTION, SUSPENSION INTRA-ARTICULAR; INTRALESIONAL; INTRAMUSCULAR; SOFT TISSUE at 11:42

## 2024-05-21 NOTE — PROGRESS NOTES
Yobany Maria, a male of 69 y.o. came to the office 5/21/2024.     Patient Active Problem List   Diagnosis    Lumbar radiculopathy    Intractable low back pain    DDD (degenerative disc disease), lumbar    Sleep apnea    Pure hypercholesterolemia    Alzheimer's disease, unspecified          Knee Pain   There was no injury mechanism. The pain is present in the right knee. The quality of the pain is described as aching. Associated symptoms include a loss of motion. He has tried NSAIDs for the symptoms. The treatment provided no relief.    - not sleeping hs.     Skin: bruise back on left leg. Denies trauma or inj. No pain or gait issues.     Allergies   Allergen Reactions    Morphine Nausea And Vomiting       Current Outpatient Medications on File Prior to Visit   Medication Sig Dispense Refill    donepezil (ARICEPT) 10 MG tablet TAKE 1 TABLET BY MOUTH EVERY NIGHT 90 tablet 0    naproxen (NAPROSYN) 500 MG tablet TAKE 1 TABLET BY MOUTH TWICE DAILY WITH MEALS (Patient taking differently: Take 1 tablet by mouth 2 times daily as needed) 60 tablet 1    Multiple Vitamins-Minerals (MENS 50+ MULTI VITAMIN/MIN PO) Take by mouth daily      NONFORMULARY nugenix      Omega-3 Fatty Acids (FISH OIL) 1000 MG CPDR Take 3 capsules by mouth daily      Apoaequorin (PREVAGEN PO) Take by mouth daily      aspirin 81 MG EC tablet Take 1 tablet by mouth daily      Turmeric 500 MG CAPS Take 2 capsules by mouth daily       No current facility-administered medications on file prior to visit.       Review of Systems   Musculoskeletal:  Positive for arthralgias, gait problem and joint swelling.     other review of systems reviewed and are negative    OBJECTIVE:  /78   Pulse 64   Temp 97.6 °F (36.4 °C)   Ht 1.778 m (5' 10\")   Wt 73.5 kg (162 lb)   SpO2 98%   BMI 23.24 kg/m²      Physical Exam  Constitutional:       General: He is not in acute distress.     Appearance: Normal appearance. He is not ill-appearing, toxic-appearing or

## 2024-07-09 DIAGNOSIS — G31.84 MCI (MILD COGNITIVE IMPAIRMENT): ICD-10-CM

## 2024-07-10 RX ORDER — DONEPEZIL HYDROCHLORIDE 10 MG/1
10 TABLET, FILM COATED ORAL NIGHTLY
Qty: 90 TABLET | Refills: 0 | Status: SHIPPED | OUTPATIENT
Start: 2024-07-10

## 2024-10-08 ENCOUNTER — OFFICE VISIT (OUTPATIENT)
Dept: PRIMARY CARE CLINIC | Age: 69
End: 2024-10-08
Payer: COMMERCIAL

## 2024-10-08 VITALS
OXYGEN SATURATION: 99 % | WEIGHT: 165 LBS | DIASTOLIC BLOOD PRESSURE: 74 MMHG | SYSTOLIC BLOOD PRESSURE: 118 MMHG | HEIGHT: 71 IN | HEART RATE: 62 BPM | BODY MASS INDEX: 23.1 KG/M2 | TEMPERATURE: 97.7 F

## 2024-10-08 DIAGNOSIS — J40 BRONCHITIS: Primary | ICD-10-CM

## 2024-10-08 PROCEDURE — 99213 OFFICE O/P EST LOW 20 MIN: CPT | Performed by: FAMILY MEDICINE

## 2024-10-08 PROCEDURE — 1123F ACP DISCUSS/DSCN MKR DOCD: CPT | Performed by: FAMILY MEDICINE

## 2024-10-08 RX ORDER — AMOXICILLIN 500 MG/1
500 CAPSULE ORAL 3 TIMES DAILY
Qty: 30 CAPSULE | Refills: 0 | Status: SHIPPED | OUTPATIENT
Start: 2024-10-08 | End: 2024-10-18

## 2024-10-08 SDOH — ECONOMIC STABILITY: FOOD INSECURITY: WITHIN THE PAST 12 MONTHS, YOU WORRIED THAT YOUR FOOD WOULD RUN OUT BEFORE YOU GOT MONEY TO BUY MORE.: NEVER TRUE

## 2024-10-08 SDOH — ECONOMIC STABILITY: FOOD INSECURITY: WITHIN THE PAST 12 MONTHS, THE FOOD YOU BOUGHT JUST DIDN'T LAST AND YOU DIDN'T HAVE MONEY TO GET MORE.: NEVER TRUE

## 2024-10-08 SDOH — ECONOMIC STABILITY: INCOME INSECURITY: HOW HARD IS IT FOR YOU TO PAY FOR THE VERY BASICS LIKE FOOD, HOUSING, MEDICAL CARE, AND HEATING?: NOT HARD AT ALL

## 2024-10-08 ASSESSMENT — ENCOUNTER SYMPTOMS
RHINORRHEA: 0
SORE THROAT: 0
COUGH: 1
WHEEZING: 1

## 2024-10-08 NOTE — PROGRESS NOTES
Yobany Maria, a male of 69 y.o. came to the office 10/8/2024.     Patient Active Problem List   Diagnosis    Lumbar radiculopathy    Intractable low back pain    DDD (degenerative disc disease), lumbar    Sleep apnea    Pure hypercholesterolemia    Alzheimer's disease, unspecified          Cough  This is a new problem. The current episode started 1 to 4 weeks ago (10 days). The problem has been gradually worsening. The cough is Productive of purulent sputum (whitish green). Associated symptoms include postnasal drip and wheezing. Pertinent negatives include no chills, ear pain, fever, headaches, nasal congestion, rhinorrhea or sore throat. He has tried OTC cough suppressant (Dayquil) for the symptoms. The treatment provided mild relief.        Allergies   Allergen Reactions    Morphine Nausea And Vomiting       Current Outpatient Medications on File Prior to Visit   Medication Sig Dispense Refill    donepezil (ARICEPT) 10 MG tablet Take 1 tablet by mouth nightly 90 tablet 0    naproxen (NAPROSYN) 500 MG tablet TAKE 1 TABLET BY MOUTH TWICE DAILY WITH MEALS (Patient taking differently: Take 1 tablet by mouth 2 times daily as needed) 60 tablet 1    Multiple Vitamins-Minerals (MENS 50+ MULTI VITAMIN/MIN PO) Take by mouth daily      NONFORMULARY nugenix      Turmeric 500 MG CAPS Take 2 capsules by mouth daily      Omega-3 Fatty Acids (FISH OIL) 1000 MG CPDR Take 3 capsules by mouth daily      Apoaequorin (PREVAGEN PO) Take by mouth daily      aspirin 81 MG EC tablet Take 1 tablet by mouth daily       No current facility-administered medications on file prior to visit.       Review of Systems   Constitutional:  Negative for chills and fever.   HENT:  Positive for postnasal drip. Negative for ear pain, rhinorrhea and sore throat.    Respiratory:  Positive for cough and wheezing.    Neurological:  Negative for headaches.     other review of systems reviewed and are negative    OBJECTIVE:  /74   Pulse 62   Temp 97.7

## 2024-10-14 ENCOUNTER — TELEPHONE (OUTPATIENT)
Dept: PRIMARY CARE CLINIC | Age: 69
End: 2024-10-14

## 2024-10-14 RX ORDER — AZITHROMYCIN 250 MG/1
TABLET, FILM COATED ORAL
Qty: 1 PACKET | Refills: 0 | Status: SHIPPED | OUTPATIENT
Start: 2024-10-14

## 2024-10-14 NOTE — TELEPHONE ENCOUNTER
Pt called in and stated that he is still not feeling well and that he is still experiencing the cough and drainage. He said that he did finish the medications that you prescribed him, but he was not sure what the next steps would be since he is still not feeling well.

## 2024-10-14 NOTE — TELEPHONE ENCOUNTER
I am going to call him in a new antibiotic called Z-Francis or azithromycin.  He will take it for 5 days and it works in his system for a total of 10.  If no improvement with this then office visit will be needed.

## 2024-10-22 ENCOUNTER — TELEPHONE (OUTPATIENT)
Dept: PRIMARY CARE CLINIC | Age: 69
End: 2024-10-22

## 2024-10-22 RX ORDER — BROMPHENIRAMINE MALEATE, PSEUDOEPHEDRINE HYDROCHLORIDE, AND DEXTROMETHORPHAN HYDROBROMIDE 2; 30; 10 MG/5ML; MG/5ML; MG/5ML
5 SYRUP ORAL 4 TIMES DAILY PRN
Qty: 200 ML | Refills: 0 | Status: SHIPPED | OUTPATIENT
Start: 2024-10-22

## 2024-10-22 NOTE — TELEPHONE ENCOUNTER
Pt's wife called in stating that he finished his two rounds of antibiotics, but he still suffering from a bad cough. She was wondering if you could send in a cough medicine to help me?    Please advise.

## 2025-02-03 DIAGNOSIS — G31.84 MCI (MILD COGNITIVE IMPAIRMENT): ICD-10-CM

## 2025-02-03 RX ORDER — DONEPEZIL HYDROCHLORIDE 10 MG/1
10 TABLET, FILM COATED ORAL NIGHTLY
Qty: 30 TABLET | Refills: 2 | Status: SHIPPED | OUTPATIENT
Start: 2025-02-03

## 2025-03-14 ENCOUNTER — TELEPHONE (OUTPATIENT)
Dept: PRIMARY CARE CLINIC | Age: 70
End: 2025-03-14

## 2025-03-14 NOTE — TELEPHONE ENCOUNTER
Have him try over-the-counter Advil Cold and Sinus to see if these will help with his stuffed up nose and sneezing.  Follow-up if no improvement or worsens

## 2025-03-14 NOTE — TELEPHONE ENCOUNTER
Yobany called and stated he has a stuffed up nose and is sneezing. He has not tried anything over the counter    He is asking if you would phone something in for him    Celso Chandler,

## 2025-05-06 DIAGNOSIS — G31.84 MCI (MILD COGNITIVE IMPAIRMENT): ICD-10-CM

## 2025-05-07 DIAGNOSIS — G31.84 MCI (MILD COGNITIVE IMPAIRMENT): ICD-10-CM

## 2025-05-07 RX ORDER — DONEPEZIL HYDROCHLORIDE 10 MG/1
10 TABLET, FILM COATED ORAL NIGHTLY
Qty: 30 TABLET | Refills: 0 | Status: SHIPPED | OUTPATIENT
Start: 2025-05-07

## 2025-05-08 RX ORDER — DONEPEZIL HYDROCHLORIDE 10 MG/1
10 TABLET, FILM COATED ORAL NIGHTLY
Qty: 90 TABLET | OUTPATIENT
Start: 2025-05-08

## 2025-06-02 ENCOUNTER — OFFICE VISIT (OUTPATIENT)
Dept: PRIMARY CARE CLINIC | Age: 70
End: 2025-06-02
Payer: COMMERCIAL

## 2025-06-02 VITALS
HEART RATE: 67 BPM | HEIGHT: 70 IN | RESPIRATION RATE: 16 BRPM | BODY MASS INDEX: 24.2 KG/M2 | DIASTOLIC BLOOD PRESSURE: 82 MMHG | OXYGEN SATURATION: 98 % | SYSTOLIC BLOOD PRESSURE: 120 MMHG | TEMPERATURE: 97.1 F | WEIGHT: 169 LBS

## 2025-06-02 DIAGNOSIS — E78.00 PURE HYPERCHOLESTEROLEMIA: ICD-10-CM

## 2025-06-02 DIAGNOSIS — Z13.6 SCREENING FOR AAA (ABDOMINAL AORTIC ANEURYSM): ICD-10-CM

## 2025-06-02 DIAGNOSIS — Z00.00 MEDICARE ANNUAL WELLNESS VISIT, SUBSEQUENT: Primary | ICD-10-CM

## 2025-06-02 DIAGNOSIS — Z23 NEED FOR PROPHYLACTIC VACCINATION AGAINST STREPTOCOCCUS PNEUMONIAE (PNEUMOCOCCUS): ICD-10-CM

## 2025-06-02 DIAGNOSIS — Z12.5 SCREENING PSA (PROSTATE SPECIFIC ANTIGEN): ICD-10-CM

## 2025-06-02 DIAGNOSIS — G31.84 MCI (MILD COGNITIVE IMPAIRMENT): ICD-10-CM

## 2025-06-02 LAB
ALBUMIN: 4.9 G/DL (ref 3.5–5.2)
ALP BLD-CCNC: 64 U/L (ref 40–129)
ALT SERPL-CCNC: 22 U/L (ref 0–50)
ANION GAP SERPL CALCULATED.3IONS-SCNC: 8 MMOL/L (ref 7–16)
AST SERPL-CCNC: 24 U/L (ref 0–50)
BILIRUB SERPL-MCNC: 0.5 MG/DL (ref 0–1.2)
BUN BLDV-MCNC: 13 MG/DL (ref 8–23)
CALCIUM SERPL-MCNC: 10.7 MG/DL (ref 8.8–10.2)
CHLORIDE BLD-SCNC: 102 MMOL/L (ref 98–107)
CHOLESTEROL, TOTAL: 296 MG/DL
CO2: 30 MMOL/L (ref 22–29)
CREAT SERPL-MCNC: 0.8 MG/DL (ref 0.7–1.2)
GFR, ESTIMATED: >90 ML/MIN/1.73M2
GLUCOSE BLD-MCNC: 102 MG/DL (ref 74–99)
HCT VFR BLD CALC: 45.9 % (ref 37–54)
HDLC SERPL-MCNC: 111 MG/DL
HEMOGLOBIN: 15 G/DL (ref 12.5–16.5)
LDL CHOLESTEROL: 169 MG/DL
MCH RBC QN AUTO: 30.4 PG (ref 26–35)
MCHC RBC AUTO-ENTMCNC: 32.7 G/DL (ref 32–34.5)
MCV RBC AUTO: 92.9 FL (ref 80–99.9)
PDW BLD-RTO: 12.7 % (ref 11.5–15)
PLATELET # BLD: 338 K/UL (ref 130–450)
PMV BLD AUTO: 10.3 FL (ref 7–12)
POTASSIUM SERPL-SCNC: 5.5 MMOL/L (ref 3.5–5.1)
PROSTATE SPECIFIC ANTIGEN: 2.14 NG/ML (ref 0–4)
RBC # BLD: 4.94 M/UL (ref 3.8–5.8)
SODIUM BLD-SCNC: 139 MMOL/L (ref 136–145)
TOTAL PROTEIN: 7.5 G/DL (ref 6.4–8.3)
TRIGL SERPL-MCNC: 81 MG/DL
VLDLC SERPL CALC-MCNC: 16 MG/DL
WBC # BLD: 5.8 K/UL (ref 4.5–11.5)

## 2025-06-02 PROCEDURE — 1123F ACP DISCUSS/DSCN MKR DOCD: CPT | Performed by: FAMILY MEDICINE

## 2025-06-02 PROCEDURE — 90677 PCV20 VACCINE IM: CPT | Performed by: FAMILY MEDICINE

## 2025-06-02 PROCEDURE — G0439 PPPS, SUBSEQ VISIT: HCPCS | Performed by: FAMILY MEDICINE

## 2025-06-02 PROCEDURE — 90471 IMMUNIZATION ADMIN: CPT | Performed by: FAMILY MEDICINE

## 2025-06-02 PROCEDURE — 99212 OFFICE O/P EST SF 10 MIN: CPT | Performed by: FAMILY MEDICINE

## 2025-06-02 RX ORDER — MEMANTINE HYDROCHLORIDE AND DONEPEZIL HYDROCHLORIDE 7; 10 MG/1; MG/1
CAPSULE ORAL
Qty: 30 CAPSULE | Refills: 2 | Status: SHIPPED | OUTPATIENT
Start: 2025-06-02

## 2025-06-02 SDOH — ECONOMIC STABILITY: FOOD INSECURITY: WITHIN THE PAST 12 MONTHS, YOU WORRIED THAT YOUR FOOD WOULD RUN OUT BEFORE YOU GOT MONEY TO BUY MORE.: NEVER TRUE

## 2025-06-02 SDOH — ECONOMIC STABILITY: FOOD INSECURITY: WITHIN THE PAST 12 MONTHS, THE FOOD YOU BOUGHT JUST DIDN'T LAST AND YOU DIDN'T HAVE MONEY TO GET MORE.: NEVER TRUE

## 2025-06-02 ASSESSMENT — PATIENT HEALTH QUESTIONNAIRE - PHQ9
SUM OF ALL RESPONSES TO PHQ QUESTIONS 1-9: 0
2. FEELING DOWN, DEPRESSED OR HOPELESS: NOT AT ALL
SUM OF ALL RESPONSES TO PHQ QUESTIONS 1-9: 0
1. LITTLE INTEREST OR PLEASURE IN DOING THINGS: NOT AT ALL

## 2025-06-02 ASSESSMENT — LIFESTYLE VARIABLES
HOW MANY STANDARD DRINKS CONTAINING ALCOHOL DO YOU HAVE ON A TYPICAL DAY: 1 OR 2
HOW OFTEN DO YOU HAVE A DRINK CONTAINING ALCOHOL: 2-3 TIMES A WEEK

## 2025-06-02 ASSESSMENT — ENCOUNTER SYMPTOMS: SHORTNESS OF BREATH: 0

## 2025-06-02 NOTE — PROGRESS NOTES
Memantine HCl-Donepezil HCl (NAMZARIC) 7-10 MG CP24; Take daily    Need for prophylactic vaccination against Streptococcus pneumoniae (pneumococcus)  -     Pneumococcal Conjugate PCV20, PF (Prevnar 20) - Clinic Administered    Screening for AAA (abdominal aortic aneurysm)  -     [place] screening for AAA; Future    - low chol diet since refuses med  - change Aricept to Namzaric    Return in 3 months (on 9/2/2025) for Medicare Annual Wellness Visit in 1 year, or for acute problem.    Dwaine Clarke,       Patient's complete Health Risk Assessment and screening values have been reviewed and are found in Flowsheets. The following problems were reviewed today and where indicated follow up appointments were made and/or referrals ordered.    Positive Risk Factor Screenings with Interventions:     Cognitive:   Clock Drawing Test (CDT): Normal  Words recalled: 0 Words Recalled  Total Score: (!) 2  Total Score Interpretation: Abnormal Mini-Cog  Interventions:  On Aricept. Long term memory great and is very active without issues but short term memory is worse.                   Safety:  Do you have any tripping hazards - loose or unsecured carpets or rugs?: (!) Yes  Interventions:  Tack down or lift up rugs.     Advanced Directives:  Do you have a Living Will?: (!) No    Intervention:  Recommend living will or medical poa                     Objective   Vitals:    06/02/25 0935   BP: 120/82   BP Site: Right Upper Arm   Patient Position: Sitting   BP Cuff Size: Medium Adult   Pulse: 67   Resp: 16   Temp: 97.1 °F (36.2 °C)   TempSrc: Temporal   SpO2: 98%   Weight: 76.7 kg (169 lb)   Height: 1.778 m (5' 10\")      Body mass index is 24.25 kg/m².      General Appearance: alert and oriented to person, place and time, well developed and well- nourished, in no acute distress  Skin: warm and dry, no rash or erythema  Head: normocephalic and atraumatic  Eyes: pupils equal, round, and reactive to light, extraocular eye

## 2025-06-05 ENCOUNTER — RESULTS FOLLOW-UP (OUTPATIENT)
Dept: PRIMARY CARE CLINIC | Age: 70
End: 2025-06-05

## 2025-06-06 ENCOUNTER — TELEPHONE (OUTPATIENT)
Dept: PRIMARY CARE CLINIC | Age: 70
End: 2025-06-06

## 2025-06-06 DIAGNOSIS — Z13.6 SCREENING FOR AAA (ABDOMINAL AORTIC ANEURYSM): Primary | ICD-10-CM

## 2025-06-06 NOTE — TELEPHONE ENCOUNTER
Peggy called from [facility] stating US AAA needs to be changed to US Retroperitonel Limited with screening for AAA for dx.     Pended correct order- please advise

## 2025-06-13 DIAGNOSIS — G31.84 MCI (MILD COGNITIVE IMPAIRMENT): ICD-10-CM

## 2025-06-13 RX ORDER — DONEPEZIL HYDROCHLORIDE 10 MG/1
10 TABLET, FILM COATED ORAL NIGHTLY
Qty: 30 TABLET | Refills: 5 | Status: SHIPPED | OUTPATIENT
Start: 2025-06-13

## 2025-06-13 NOTE — TELEPHONE ENCOUNTER
Name of Medication(s) Requested:  Requested Prescriptions     Pending Prescriptions Disp Refills    donepezil (ARICEPT) 10 MG tablet [Pharmacy Med Name: DONEPEZIL 10MG TABLETS] 30 tablet 5     Sig: TAKE 1 TABLET BY MOUTH EVERY NIGHT       Medication is on current medication list Yes    Dosage and directions were verified? Yes    Quantity verified: 30 day supply     Pharmacy Verified?  Yes    Last Appointment:  6/2/2025    Future appts:  Future Appointments   Date Time Provider Department Center   9/4/2025  9:30 AM Dwaine Clarke DO [facility]        (If no appt send self scheduling link. .REFILLAPPT)  Scheduling request sent?     [] Yes  [x] No    Does patient need updated?  [] Yes  [x] No

## 2025-06-19 ENCOUNTER — RESULTS FOLLOW-UP (OUTPATIENT)
Dept: PRIMARY CARE CLINIC | Age: 70
End: 2025-06-19

## 2025-06-19 DIAGNOSIS — Z13.6 SCREENING FOR AAA (ABDOMINAL AORTIC ANEURYSM): ICD-10-CM

## 2025-07-10 DIAGNOSIS — Z87.891 HISTORY OF TOBACCO ABUSE: Primary | ICD-10-CM

## 2025-07-10 NOTE — PROGRESS NOTES
[facility] imaging not doing abdominal ultrasound and less diagnosis of history of tobacco abuse used.  This is a preventative ultrasound for any meal between the ages of 65 and 75 that smokes or was a prior smoker.

## 2025-09-04 ENCOUNTER — OFFICE VISIT (OUTPATIENT)
Dept: PRIMARY CARE CLINIC | Age: 70
End: 2025-09-04
Payer: COMMERCIAL

## 2025-09-04 VITALS
RESPIRATION RATE: 17 BRPM | WEIGHT: 165.5 LBS | HEIGHT: 70 IN | DIASTOLIC BLOOD PRESSURE: 82 MMHG | SYSTOLIC BLOOD PRESSURE: 126 MMHG | HEART RATE: 64 BPM | OXYGEN SATURATION: 97 % | TEMPERATURE: 97 F | BODY MASS INDEX: 23.69 KG/M2

## 2025-09-04 DIAGNOSIS — G31.84 MCI (MILD COGNITIVE IMPAIRMENT): Primary | ICD-10-CM

## 2025-09-04 DIAGNOSIS — E78.00 PURE HYPERCHOLESTEROLEMIA: ICD-10-CM

## 2025-09-04 DIAGNOSIS — R51.9 ACUTE INTRACTABLE HEADACHE, UNSPECIFIED HEADACHE TYPE: ICD-10-CM

## 2025-09-04 PROCEDURE — G8420 CALC BMI NORM PARAMETERS: HCPCS | Performed by: FAMILY MEDICINE

## 2025-09-04 PROCEDURE — 1123F ACP DISCUSS/DSCN MKR DOCD: CPT | Performed by: FAMILY MEDICINE

## 2025-09-04 PROCEDURE — G8427 DOCREV CUR MEDS BY ELIG CLIN: HCPCS | Performed by: FAMILY MEDICINE

## 2025-09-04 PROCEDURE — 99213 OFFICE O/P EST LOW 20 MIN: CPT | Performed by: FAMILY MEDICINE

## 2025-09-04 PROCEDURE — 3017F COLORECTAL CA SCREEN DOC REV: CPT | Performed by: FAMILY MEDICINE

## 2025-09-04 PROCEDURE — 1036F TOBACCO NON-USER: CPT | Performed by: FAMILY MEDICINE

## 2025-09-04 RX ORDER — MEMANTINE HYDROCHLORIDE 5 MG/1
5 TABLET ORAL 2 TIMES DAILY
Qty: 60 TABLET | Refills: 3 | Status: SHIPPED | OUTPATIENT
Start: 2025-09-04

## 2025-09-04 RX ORDER — ROSUVASTATIN CALCIUM 10 MG/1
10 TABLET, COATED ORAL DAILY
Qty: 30 TABLET | Refills: 3 | Status: SHIPPED | OUTPATIENT
Start: 2025-09-04

## 2025-09-04 ASSESSMENT — ENCOUNTER SYMPTOMS
SHORTNESS OF BREATH: 0
COUGH: 1